# Patient Record
Sex: FEMALE | Race: WHITE | NOT HISPANIC OR LATINO
[De-identification: names, ages, dates, MRNs, and addresses within clinical notes are randomized per-mention and may not be internally consistent; named-entity substitution may affect disease eponyms.]

---

## 2019-09-18 ENCOUNTER — RESULT REVIEW (OUTPATIENT)
Age: 74
End: 2019-09-18

## 2020-09-22 ENCOUNTER — RESULT REVIEW (OUTPATIENT)
Age: 75
End: 2020-09-22

## 2021-03-25 ENCOUNTER — INPATIENT (INPATIENT)
Facility: HOSPITAL | Age: 76
LOS: 4 days | Discharge: HOME | DRG: 443 | End: 2021-03-30
Attending: STUDENT IN AN ORGANIZED HEALTH CARE EDUCATION/TRAINING PROGRAM | Admitting: STUDENT IN AN ORGANIZED HEALTH CARE EDUCATION/TRAINING PROGRAM
Payer: COMMERCIAL

## 2021-03-25 VITALS
SYSTOLIC BLOOD PRESSURE: 133 MMHG | OXYGEN SATURATION: 100 % | WEIGHT: 125 LBS | DIASTOLIC BLOOD PRESSURE: 72 MMHG | TEMPERATURE: 98 F | HEIGHT: 66 IN | HEART RATE: 98 BPM | RESPIRATION RATE: 16 BRPM

## 2021-03-25 DIAGNOSIS — Z98.890 OTHER SPECIFIED POSTPROCEDURAL STATES: Chronic | ICD-10-CM

## 2021-03-25 DIAGNOSIS — Z90.89 ACQUIRED ABSENCE OF OTHER ORGANS: Chronic | ICD-10-CM

## 2021-03-25 DIAGNOSIS — B19.9 UNSPECIFIED VIRAL HEPATITIS WITHOUT HEPATIC COMA: ICD-10-CM

## 2021-03-25 DIAGNOSIS — E78.5 HYPERLIPIDEMIA, UNSPECIFIED: ICD-10-CM

## 2021-03-25 DIAGNOSIS — E03.9 HYPOTHYROIDISM, UNSPECIFIED: ICD-10-CM

## 2021-03-25 DIAGNOSIS — R17 UNSPECIFIED JAUNDICE: ICD-10-CM

## 2021-03-25 DIAGNOSIS — J33.9 NASAL POLYP, UNSPECIFIED: ICD-10-CM

## 2021-03-25 DIAGNOSIS — Z98.49 CATARACT EXTRACTION STATUS, UNSPECIFIED EYE: Chronic | ICD-10-CM

## 2021-03-25 DIAGNOSIS — Z29.9 ENCOUNTER FOR PROPHYLACTIC MEASURES, UNSPECIFIED: ICD-10-CM

## 2021-03-25 LAB
ALBUMIN SERPL ELPH-MCNC: 3.8 G/DL — SIGNIFICANT CHANGE UP (ref 3.3–5)
ALP SERPL-CCNC: 697 U/L — HIGH (ref 40–120)
ALT FLD-CCNC: 2898 U/L — HIGH (ref 12–78)
ANION GAP SERPL CALC-SCNC: 6 MMOL/L — SIGNIFICANT CHANGE UP (ref 5–17)
APAP SERPL-MCNC: <2 UG/ML — LOW (ref 10–30)
APTT BLD: 37.1 SEC — HIGH (ref 27.5–35.5)
AST SERPL-CCNC: 2040 U/L — HIGH (ref 15–37)
BASOPHILS # BLD AUTO: 0.02 K/UL — SIGNIFICANT CHANGE UP (ref 0–0.2)
BASOPHILS NFR BLD AUTO: 0.3 % — SIGNIFICANT CHANGE UP (ref 0–2)
BILIRUB DIRECT SERPL-MCNC: 12.9 MG/DL — HIGH (ref 0.05–0.2)
BILIRUB INDIRECT FLD-MCNC: 3.6 MG/DL — HIGH (ref 0.2–1)
BILIRUB SERPL-MCNC: 16.5 MG/DL — CRITICAL HIGH (ref 0.2–1.2)
BUN SERPL-MCNC: 16 MG/DL — SIGNIFICANT CHANGE UP (ref 7–23)
CALCIUM SERPL-MCNC: 10.2 MG/DL — HIGH (ref 8.5–10.1)
CHLORIDE SERPL-SCNC: 103 MMOL/L — SIGNIFICANT CHANGE UP (ref 96–108)
CK SERPL-CCNC: 95 U/L — SIGNIFICANT CHANGE UP (ref 26–192)
CO2 SERPL-SCNC: 29 MMOL/L — SIGNIFICANT CHANGE UP (ref 22–31)
CREAT SERPL-MCNC: 0.93 MG/DL — SIGNIFICANT CHANGE UP (ref 0.5–1.3)
EOSINOPHIL # BLD AUTO: 0.11 K/UL — SIGNIFICANT CHANGE UP (ref 0–0.5)
EOSINOPHIL NFR BLD AUTO: 1.6 % — SIGNIFICANT CHANGE UP (ref 0–6)
GLUCOSE SERPL-MCNC: 102 MG/DL — HIGH (ref 70–99)
HCT VFR BLD CALC: 39.6 % — SIGNIFICANT CHANGE UP (ref 34.5–45)
HGB BLD-MCNC: 13.6 G/DL — SIGNIFICANT CHANGE UP (ref 11.5–15.5)
IMM GRANULOCYTES NFR BLD AUTO: 0.4 % — SIGNIFICANT CHANGE UP (ref 0–1.5)
INR BLD: 1.08 RATIO — SIGNIFICANT CHANGE UP (ref 0.88–1.16)
IRON SATN MFR SERPL: 224 UG/DL — HIGH (ref 30–160)
IRON SATN MFR SERPL: 53 % — HIGH (ref 14–50)
LIDOCAIN IGE QN: 174 U/L — SIGNIFICANT CHANGE UP (ref 73–393)
LYMPHOCYTES # BLD AUTO: 1.58 K/UL — SIGNIFICANT CHANGE UP (ref 1–3.3)
LYMPHOCYTES # BLD AUTO: 23.6 % — SIGNIFICANT CHANGE UP (ref 13–44)
MCHC RBC-ENTMCNC: 30.4 PG — SIGNIFICANT CHANGE UP (ref 27–34)
MCHC RBC-ENTMCNC: 34.3 GM/DL — SIGNIFICANT CHANGE UP (ref 32–36)
MCV RBC AUTO: 88.6 FL — SIGNIFICANT CHANGE UP (ref 80–100)
MONOCYTES # BLD AUTO: 0.77 K/UL — SIGNIFICANT CHANGE UP (ref 0–0.9)
MONOCYTES NFR BLD AUTO: 11.5 % — SIGNIFICANT CHANGE UP (ref 2–14)
NEUTROPHILS # BLD AUTO: 4.19 K/UL — SIGNIFICANT CHANGE UP (ref 1.8–7.4)
NEUTROPHILS NFR BLD AUTO: 62.6 % — SIGNIFICANT CHANGE UP (ref 43–77)
NRBC # BLD: 0 /100 WBCS — SIGNIFICANT CHANGE UP (ref 0–0)
PLATELET # BLD AUTO: 180 K/UL — SIGNIFICANT CHANGE UP (ref 150–400)
POTASSIUM SERPL-MCNC: 3.9 MMOL/L — SIGNIFICANT CHANGE UP (ref 3.5–5.3)
POTASSIUM SERPL-SCNC: 3.9 MMOL/L — SIGNIFICANT CHANGE UP (ref 3.5–5.3)
PROT SERPL-MCNC: 7.6 G/DL — SIGNIFICANT CHANGE UP (ref 6–8.3)
PROTHROM AB SERPL-ACNC: 12.6 SEC — SIGNIFICANT CHANGE UP (ref 10.6–13.6)
RBC # BLD: 4.47 M/UL — SIGNIFICANT CHANGE UP (ref 3.8–5.2)
RBC # FLD: 18 % — HIGH (ref 10.3–14.5)
SALICYLATES SERPL-MCNC: <1.7 MG/DL — LOW (ref 2.8–20)
SARS-COV-2 RNA SPEC QL NAA+PROBE: SIGNIFICANT CHANGE UP
SODIUM SERPL-SCNC: 138 MMOL/L — SIGNIFICANT CHANGE UP (ref 135–145)
TIBC SERPL-MCNC: 427 UG/DL — SIGNIFICANT CHANGE UP (ref 220–430)
UIBC SERPL-MCNC: 203 UG/DL — SIGNIFICANT CHANGE UP (ref 110–370)
WBC # BLD: 6.7 K/UL — SIGNIFICANT CHANGE UP (ref 3.8–10.5)
WBC # FLD AUTO: 6.7 K/UL — SIGNIFICANT CHANGE UP (ref 3.8–10.5)

## 2021-03-25 PROCEDURE — G1004: CPT

## 2021-03-25 PROCEDURE — 74177 CT ABD & PELVIS W/CONTRAST: CPT | Mod: 26,ME

## 2021-03-25 PROCEDURE — 99223 1ST HOSP IP/OBS HIGH 75: CPT | Mod: GC,AI

## 2021-03-25 PROCEDURE — 99285 EMERGENCY DEPT VISIT HI MDM: CPT

## 2021-03-25 RX ORDER — LEVOTHYROXINE SODIUM 125 MCG
1 TABLET ORAL
Qty: 0 | Refills: 0 | DISCHARGE

## 2021-03-25 RX ORDER — FLUTICASONE PROPIONATE 50 MCG
1 SPRAY, SUSPENSION NASAL
Qty: 0 | Refills: 0 | DISCHARGE

## 2021-03-25 RX ORDER — SODIUM CHLORIDE 9 MG/ML
1000 INJECTION INTRAMUSCULAR; INTRAVENOUS; SUBCUTANEOUS
Refills: 0 | Status: DISCONTINUED | OUTPATIENT
Start: 2021-03-25 | End: 2021-03-27

## 2021-03-25 RX ORDER — MONTELUKAST 4 MG/1
10 TABLET, CHEWABLE ORAL DAILY
Refills: 0 | Status: DISCONTINUED | OUTPATIENT
Start: 2021-03-25 | End: 2021-03-30

## 2021-03-25 RX ORDER — LORATADINE 10 MG/1
10 TABLET ORAL DAILY
Refills: 0 | Status: DISCONTINUED | OUTPATIENT
Start: 2021-03-25 | End: 2021-03-30

## 2021-03-25 RX ORDER — LEVOTHYROXINE SODIUM 125 MCG
25 TABLET ORAL DAILY
Refills: 0 | Status: DISCONTINUED | OUTPATIENT
Start: 2021-03-25 | End: 2021-03-30

## 2021-03-25 RX ORDER — CHLORPHENIRAMINE MALEATE 4 MG
1 TABLET ORAL
Qty: 0 | Refills: 0 | DISCHARGE

## 2021-03-25 RX ORDER — MONTELUKAST 4 MG/1
1 TABLET, CHEWABLE ORAL
Qty: 0 | Refills: 0 | DISCHARGE

## 2021-03-25 RX ORDER — SODIUM CHLORIDE 9 MG/ML
1000 INJECTION INTRAMUSCULAR; INTRAVENOUS; SUBCUTANEOUS ONCE
Refills: 0 | Status: COMPLETED | OUTPATIENT
Start: 2021-03-25 | End: 2021-03-25

## 2021-03-25 RX ORDER — HEPARIN SODIUM 5000 [USP'U]/ML
5000 INJECTION INTRAVENOUS; SUBCUTANEOUS EVERY 12 HOURS
Refills: 0 | Status: DISCONTINUED | OUTPATIENT
Start: 2021-03-25 | End: 2021-03-30

## 2021-03-25 RX ORDER — FLUTICASONE PROPIONATE 50 MCG
1 SPRAY, SUSPENSION NASAL
Refills: 0 | Status: DISCONTINUED | OUTPATIENT
Start: 2021-03-25 | End: 2021-03-30

## 2021-03-25 RX ADMIN — SODIUM CHLORIDE 1000 MILLILITER(S): 9 INJECTION INTRAMUSCULAR; INTRAVENOUS; SUBCUTANEOUS at 13:40

## 2021-03-25 RX ADMIN — SODIUM CHLORIDE 50 MILLILITER(S): 9 INJECTION INTRAMUSCULAR; INTRAVENOUS; SUBCUTANEOUS at 21:39

## 2021-03-25 NOTE — H&P ADULT - ATTENDING COMMENTS
74 YO F PMHX hypothyroidism, HLD, nasal polyps, oA b/l knees, admit for viral hepatitis. Plan: trend lfts, apprec GI/Hepatology collaboration in care, f/u viral hepatitis panel, monitor clinical course

## 2021-03-25 NOTE — H&P ADULT - PROBLEM SELECTOR PLAN 5
vte ppx heparin     IMPROVE VTE Individual Risk Assessment        RISK                                                          Points  [  ] Previous VTE                                                3  [  ] Thrombophilia                                             2  [  ] Lower limb paralysis                                   2        (unable to hold up >15 seconds)    [  ] Current Cancer                                            2         (within 6 months)  [  ] Immobilization > 24 hrs                              1  [  ] ICU/CCU stay > 24 hours                            1  [  ] Age > 60                                                    1  IMPROVE VTE Score ____1_____

## 2021-03-25 NOTE — H&P ADULT - NSHPOUTPATIENTPROVIDERS_GEN_ALL_CORE
[facility] The mother chose not to exclusively breastfeed upon admission.

## 2021-03-25 NOTE — CONSULT NOTE ADULT - SUBJECTIVE AND OBJECTIVE BOX
Chief Complaint:  Patient is a 75y old  Female who presents with a chief complaint of  jaundice    Allergy    Hyperlipidemia    Hypothyroid    No significant past surgical history       HPI: 74yo female p/w yellow sclera" since this past Monday.  Pt had blood work done 3/24/21 with T bili 15.   Pt has been taking  Rosuvastatin.   Pt has been on Tumeric supplement since 12/2020 and no other change in meds.  Ho recent illness, travel outside of [place]. gi consulted for jaundice. chart reviewed. pt seen and examined. no real significant pmhx. states on monday noticed her urine to be brighter and w some yellowing of her eyes. later that night noted entire body to be yellow. [facility] and had labs and ua done, also w rx for abd sono (but not done). was called by pcp regarding very high tbili and instructed to come to ed. denies recent travel, known prior hep exposure, etoh use. denies f/c/wt loss, n/v/d/c/abd pain, melena, brbpr, acholic stools. hx of colon <10yrs ago and reportedly normal. has never had egd. no prior abd sx. fhx- mother w gb issue in her 70s, cannot elaborate further, was removed. meds- low dose statin and tumeric since this past december; home med list reviewed. denies toxic habits.    recent vs/labs/imaging reviewed- afebrile mildly tachy normotensive  inr/plts wnl  elevated tbii/lfts noted, primarily direct  imaging pending  lfts normal 10/2020 as per HIE    aspirin (Rash)      sodium chloride 0.9% Bolus 1000 milliLiter(s) IV Bolus once        FAMILY HISTORY:        Review of Systems:    General:  No wt loss, fevers, chills, night sweats, fatigue  Eyes:  Good vision, no reported pain  ENT:  No sore throat, pain, runny nose, dysphagia  CV:  No pain, palpitations, no lightheadedness  Resp:  No dyspnea, cough, tachypnea, wheezing  GI: see above  :  see above  Muscle:  No pain, weakness  Neuro:  No weakness, tingling, memory problems  Psych:  No fatigue, insomnia, mood problems, depression  Endocrine:  No polyuria, polydypsia, cold/heat intolerance  Heme:  No petechiae, ecchymosis, easy bruisability  Skin:  see above    Relevant Family History:   n/c    Relevant Social History: n/c      Physical Exam:    Vital Signs:  Vital Signs Last 24 Hrs  T(C): 36.7 (25 Mar 2021 12:14), Max: 36.7 (25 Mar 2021 12:14)  T(F): 98 (25 Mar 2021 12:14), Max: 98 (25 Mar 2021 12:14)  HR: 98 (25 Mar 2021 12:14) (98 - 98)  BP: 133/72 (25 Mar 2021 12:14) (133/72 - 133/72)  BP(mean): --  RR: 16 (25 Mar 2021 12:14) (16 - 16)  SpO2: 100% (25 Mar 2021 12:14) (100% - 100%)  Daily Height in cm: 167.64 (25 Mar 2021 12:14)    Daily     General:  lying in bed in nad  HEENT:  NC/AT,  scleral icterus  Abdomen:  Soft, non-tender, non-distended  Extremities:  no edema  Skin:  diffuse jaundice  Neuro/Psych:  A&Ox3    Laboratory:                            13.6   6.70  )-----------( 180      ( 25 Mar 2021 13:43 )             39.6     03-25    138  |  103  |  16  ----------------------------<  102<H>  3.9   |  29  |  0.93    Ca    10.2<H>      25 Mar 2021 13:43    TPro  7.6  /  Alb  3.8  /  TBili  16.5<HH>  /  DBili  12.90<H>  /  AST  2040<H>  /  ALT  2898<H>  /  AlkPhos  697<H>  03-25    LIVER FUNCTIONS - ( 25 Mar 2021 13:43 )  Alb: 3.8 g/dL / Pro: 7.6 g/dL / ALK PHOS: 697 U/L / ALT: 2898 U/L / AST: 2040 U/L / GGT: x           PT/INR - ( 25 Mar 2021 13:42 )   PT: 12.6 sec;   INR: 1.08 ratio         PTT - ( 25 Mar 2021 13:42 )  PTT:37.1 sec    Amylase Serum--      Lipase [id number]       Ammonia--    Imaging:  pending

## 2021-03-25 NOTE — H&P ADULT - PROBLEM SELECTOR PLAN 1
-viral hepatitis, painless jaundice, yellow urine, scleral icterics since Monday, no lesions noted in the pancreas/ampullary lesion    -total bilirubin 16.5, alk P 697, AST 2040, ALT 2898, direct bilirubin 12.9, indirect BR 3.6.   -CT abd and pelvis gallbladder wall edema and periportal edema. Right hepatic cyst with multiple indeterminate hypodense hepatic lesions.  -trend liver enzymes, bilirubin   -hold home rosuvastatin, hold hepatotoxins   -NPO in case of surgery   -Pt received 1L NS bolus  -consulted GI Dr. Rowley -viral hepatitis, painless jaundice, yellow urine, scleral icterics since Monday, no lesions noted in the pancreas/ampullary lesion    -total bilirubin 16.5, alk P 697, AST 2040, ALT 2898, direct bilirubin 12.9, indirect BR 3.6.   -CT abd and pelvis gallbladder wall edema and periportal edema. Right hepatic cyst with multiple indeterminate hypodense hepatic lesions.  -trend hepatic panel, liver enzymes, bilirubin   -IV rate 50 x 24 hrs  -hold home rosuvastatin, hold hepatotoxins   -NPO after midnight in case of procedure    -Pt received 1L NS bolus  -consulted GI Dr. Rowley -viral hepatitis, painless jaundice, yellow urine, scleral icterics since Monday, +Courvoisier's sign   -total bilirubin 16.5, alk P 697, AST 2040, ALT 2898, direct bilirubin 12.9, indirect BR 3.6.   -CT abd and pelvis gallbladder wall edema and periportal edema. Right hepatic cyst with multiple indeterminate hypodense hepatic lesions.  -Pt received 1L NS bolus  -has been taking tumeric, however is not known to be hepatotoxic   -trend hepatic panel, liver enzymes, bilirubin   -IV rate 50 x 24 hrs  -hold home rosuvastatin, hold hepatotoxins   -NPO after midnight in case of procedure    -consider ordering Ca-19 to r/o pancreatic cancer   -consulted GI Dr. Rowley

## 2021-03-25 NOTE — H&P ADULT - PROBLEM SELECTOR PLAN 4
hx of nasal polyps, allergies  continue montelukast 10 mg qD, fluticasone 1 spray qD hx of nasal polyps, allergies  takes montelukast 10 mg qD at home, start Claritin qD as interchange   continue home fluticasone 1 spray each nostril qD

## 2021-03-25 NOTE — H&P ADULT - NSHPREVIEWOFSYSTEMS_GEN_ALL_CORE
CONSTITUTIONAL: denies fever, chills, fatigue, weakness  HEENT: admits to scleral icterus, denies blurred visions, sore throat  SKIN: admits jaundice, denies new lesions, rash  CARDIOVASCULAR: denies chest pain, chest pressure, palpitations  RESPIRATORY: denies shortness of breath, sputum production  GASTROINTESTINAL: denies nausea, vomiting, diarrhea, abdominal pain  GENITOURINARY: admits to bright yellow urine, denies dysuria, discharge  NEUROLOGICAL: denies numbness, headache, focal weakness  MUSCULOSKELETAL: denies new joint pain, muscle aches  HEMATOLOGIC: denies gross bleeding, bruising  LYMPHATICS: denies enlarged lymph nodes, extremity swelling  PSYCHIATRIC: denies recent changes in anxiety, depression  ENDOCRINOLOGIC: denies sweating, cold or heat intolerance

## 2021-03-25 NOTE — H&P ADULT - NSICDXPASTSURGICALHX_GEN_ALL_CORE_FT
PAST SURGICAL HISTORY:  H/O removal of cyst left 3rd digit    S/P cataract surgery     S/P tonsillectomy

## 2021-03-25 NOTE — ED PROVIDER NOTE - OBJECTIVE STATEMENT
"Yellow sclera" since this past Monday.  Pt had blood work done 3/24/21 with T bili 15.   Pt has been taking  Rosuvastatin.   Pt has been on Tumeric supplement since 12/2020 and no other change in meds.  Ho recent illness, travel outside of [place]

## 2021-03-25 NOTE — ED ADULT TRIAGE NOTE - CHIEF COMPLAINT QUOTE
"For some reason out of the blue I turned jaundiced on Monday."  pt had bloodwork and was told by her doctor to go to ED based on blood test results  results show bilirubin of 15.0 mg/dl

## 2021-03-25 NOTE — H&P ADULT - NSHPSOCIALHISTORY_GEN_ALL_CORE
smoked for 2 years in college socially 2-3 cigg. 1x a month at parties, used to drink 1-2 wine glasses a week but since the pandemic has had 1-2 jolie every 4 months, denies other drug use   lives at home alone,   recently, performs all ADLs  ambulates without assistance   Pf[employer] COVID vaccine  and  finished 2/2 doses, had flu vaccine last year    FULL CODE

## 2021-03-25 NOTE — H&P ADULT - HISTORY OF PRESENT ILLNESS
74 YO F PMHX hypothyroidism, HLD, nasal polyps, oA b/l knees, here for jaundice since Monday sent in by PCP for total bilirubin 15. Pt  76 YO F PMHX hypothyroidism, HLD, nasal polyps, oA b/l knees, here for jaundice since Monday sent in by PCP for total bilirubin ~15. On Monday pt noticed bright yellow urine, sclera icterus. Pt then got worried and started drinking more water. Pt then took a shower and noticed her chest and stomach was yellow. Pt did not see a PCP until Wednesday because she was not in pain. On Wednesday pt saw Dr. Hammond, at her PCP's office, had bloodwork and UA done. Pt was supposed to to get an RUQ/abdominal US today, however she received a call form Dr. Hammond to immediately go to the ER for a total bilirubin level of ~15.  Of note, pt has been on rosuvastatin for about 6 years, has been taking tumeric 450 mg since December (for oA). Pt denies fatigue, travel, recent illness. Of note, pt's mother had "some type of liver problem". Pt denies fever, chills, CP, SOB, abdominal pain, edema.     Ed vitals 133/72, HR 98, RR 16, afebrile, 100% on RA.   Labs significant for total bilirubin 16.5, alk P 697, AST 2040, ALT 2898, Br 12.9, indirect bilirubin 3.6.   Pt received 1L NS bolus.   CT abd and pelvis gallbladder wall edema and periportal edema. Findings nonspecific, correlate clinically for acute viral hepatitis. Right hepatic cyst with multiple indeterminate hypodense hepatic lesions.

## 2021-03-25 NOTE — ED ADULT NURSE NOTE - OBJECTIVE STATEMENT
Pt presented to ED complaining of jaundice. Sent by PCP. Noted that Monday had bright yellow urine, Tuesday developed sclera juandice, Wednesday generalized jaundice. Per labs drawn yesterday, elevated LFTs. Notes pt taking 1000mg tumeric daily.

## 2021-03-25 NOTE — H&P ADULT - NSHPPHYSICALEXAM_GEN_ALL_CORE
Vital Signs Last 24 Hrs  T(C): 36.6 (25 Mar 2021 19:11), Max: 36.7 (25 Mar 2021 12:14)  T(F): 97.9 (25 Mar 2021 19:11), Max: 98 (25 Mar 2021 12:14)  HR: 82 (25 Mar 2021 19:11) (82 - 98)  BP: 117/74 (25 Mar 2021 19:11) (117/74 - 133/72)  BP(mean): --  RR: 17 (25 Mar 2021 19:11) (16 - 17)  SpO2: 99% (25 Mar 2021 19:11) (99% - 100%)    Physical Exam:  General: jaundice all over body, well developed, well nourished, no acute distress  HEENT: scleral icterus, normocephallic Atraumatic, PERRLA, EOMI bl, moist mucous membranes   Neck: Supple, nontender, no cervical lymphadenopathy  Neurology: no asterixis, A&Ox3, no focal neurological deficits: CNII-XII intact  Respiratory: Clear To Auscultation B/L, No Wheezes, rhonchi or rales  CV: Regular Rate and Rhythm, +S1/S2, no murmurs, rubs or gallops  Abdominal: Soft, Non-Tender, no TTP, Non-Distended +Bowel Soundsx4  Extremities: LLE oncocytosis, No Clubbing, cyanosis or edema, + peripheral pulses: cap refill <2 secs LE bilaterally  Skin: jaundice all over body, warm, dry, normal color

## 2021-03-25 NOTE — CONSULT NOTE ADULT - PROBLEM SELECTOR RECOMMENDATION 9
concern for head of pancreas/ampullary lesion  await imaging, CT pending  will check liver serologies  hold home statin  trend labs, avoid hepatotoxins  further gi recs pending above; plan dw attg and agreeable

## 2021-03-26 ENCOUNTER — TRANSCRIPTION ENCOUNTER (OUTPATIENT)
Age: 76
End: 2021-03-26

## 2021-03-26 DIAGNOSIS — R79.89 OTHER SPECIFIED ABNORMAL FINDINGS OF BLOOD CHEMISTRY: ICD-10-CM

## 2021-03-26 DIAGNOSIS — R79.0 ABNORMAL LEVEL OF BLOOD MINERAL: ICD-10-CM

## 2021-03-26 LAB
A1AT SERPL-MCNC: 201 MG/DL — HIGH (ref 90–200)
AFP-TM SERPL-MCNC: 7 NG/ML — SIGNIFICANT CHANGE UP
ALBUMIN SERPL ELPH-MCNC: 3.2 G/DL — LOW (ref 3.3–5)
ALP SERPL-CCNC: 562 U/L — HIGH (ref 40–120)
ALT FLD-CCNC: 2302 U/L — HIGH (ref 12–78)
ANA TITR SER: NEGATIVE — SIGNIFICANT CHANGE UP
ANION GAP SERPL CALC-SCNC: 5 MMOL/L — SIGNIFICANT CHANGE UP (ref 5–17)
APPEARANCE UR: CLEAR — SIGNIFICANT CHANGE UP
AST SERPL-CCNC: 1906 U/L — HIGH (ref 15–37)
BACTERIA # UR AUTO: ABNORMAL
BASOPHILS # BLD AUTO: 0.03 K/UL — SIGNIFICANT CHANGE UP (ref 0–0.2)
BASOPHILS NFR BLD AUTO: 0.5 % — SIGNIFICANT CHANGE UP (ref 0–2)
BILIRUB SERPL-MCNC: 14.4 MG/DL — HIGH (ref 0.2–1.2)
BILIRUB UR-MCNC: ABNORMAL
BUN SERPL-MCNC: 12 MG/DL — SIGNIFICANT CHANGE UP (ref 7–23)
CALCIUM SERPL-MCNC: 9 MG/DL — SIGNIFICANT CHANGE UP (ref 8.5–10.1)
CANCER AG19-9 SERPL-ACNC: 15 U/ML — SIGNIFICANT CHANGE UP
CEA SERPL-MCNC: 1.9 NG/ML — SIGNIFICANT CHANGE UP (ref 0–3.8)
CERULOPLASMIN SERPL-MCNC: 35 MG/DL — SIGNIFICANT CHANGE UP (ref 16–45)
CHLORIDE SERPL-SCNC: 108 MMOL/L — SIGNIFICANT CHANGE UP (ref 96–108)
CMV IGG FLD QL: <0.2 U/ML — SIGNIFICANT CHANGE UP
CMV IGG SERPL-IMP: NEGATIVE — SIGNIFICANT CHANGE UP
CMV IGM FLD-ACNC: <8 AU/ML — SIGNIFICANT CHANGE UP
CMV IGM SERPL QL: NEGATIVE — SIGNIFICANT CHANGE UP
CO2 SERPL-SCNC: 28 MMOL/L — SIGNIFICANT CHANGE UP (ref 22–31)
COLOR SPEC: YELLOW — SIGNIFICANT CHANGE UP
COVID-19 SPIKE DOMAIN AB INTERP: POSITIVE
COVID-19 SPIKE DOMAIN ANTIBODY RESULT: >250 U/ML — HIGH
CREAT SERPL-MCNC: 0.74 MG/DL — SIGNIFICANT CHANGE UP (ref 0.5–1.3)
DIFF PNL FLD: NEGATIVE — SIGNIFICANT CHANGE UP
EBV EA AB SER IA-ACNC: >150 U/ML — HIGH
EBV EA AB TITR SER IF: POSITIVE
EBV EA IGG SER-ACNC: POSITIVE
EBV NA IGG SER IA-ACNC: 560 U/ML — HIGH
EBV PATRN SPEC IB-IMP: SIGNIFICANT CHANGE UP
EBV VCA IGG AVIDITY SER QL IA: POSITIVE
EBV VCA IGM SER IA-ACNC: 253 U/ML — HIGH
EBV VCA IGM SER IA-ACNC: <10 U/ML — SIGNIFICANT CHANGE UP
EBV VCA IGM TITR FLD: NEGATIVE — SIGNIFICANT CHANGE UP
EOSINOPHIL # BLD AUTO: 0.27 K/UL — SIGNIFICANT CHANGE UP (ref 0–0.5)
EOSINOPHIL NFR BLD AUTO: 4.4 % — SIGNIFICANT CHANGE UP (ref 0–6)
EPI CELLS # UR: SIGNIFICANT CHANGE UP
FERRITIN SERPL-MCNC: 3118 NG/ML — HIGH (ref 15–150)
GLUCOSE SERPL-MCNC: 74 MG/DL — SIGNIFICANT CHANGE UP (ref 70–99)
GLUCOSE UR QL: NEGATIVE — SIGNIFICANT CHANGE UP
HAV IGM SER-ACNC: SIGNIFICANT CHANGE UP
HBV CORE IGM SER-ACNC: SIGNIFICANT CHANGE UP
HBV DNA # SERPL NAA+PROBE: SIGNIFICANT CHANGE UP IU/ML
HBV DNA SERPL NAA+PROBE-LOG#: SIGNIFICANT CHANGE UP LOG10IU/ML
HBV SURFACE AG SER-ACNC: SIGNIFICANT CHANGE UP
HCT VFR BLD CALC: 35.6 % — SIGNIFICANT CHANGE UP (ref 34.5–45)
HCV AB S/CO SERPL IA: 0.04 S/CO — SIGNIFICANT CHANGE UP (ref 0–0.99)
HCV AB S/CO SERPL IA: 0.05 S/CO — SIGNIFICANT CHANGE UP (ref 0–0.99)
HCV AB SERPL-IMP: SIGNIFICANT CHANGE UP
HCV AB SERPL-IMP: SIGNIFICANT CHANGE UP
HCV RNA SERPL NAA DL=5-ACNC: SIGNIFICANT CHANGE UP IU/ML
HCV RNA SPEC NAA+PROBE-LOG IU: SIGNIFICANT CHANGE UP LOG10IU/ML
HETEROPH AB TITR SER AGGL: NEGATIVE — SIGNIFICANT CHANGE UP
HGB BLD-MCNC: 12.2 G/DL — SIGNIFICANT CHANGE UP (ref 11.5–15.5)
HIV 1 & 2 AB SERPL IA.RAPID: SIGNIFICANT CHANGE UP
HSV1 IGG SER-ACNC: 0.16 INDEX — SIGNIFICANT CHANGE UP
HSV1 IGG SERPL QL IA: NEGATIVE — SIGNIFICANT CHANGE UP
HSV2 IGG FLD-ACNC: 0.06 INDEX — SIGNIFICANT CHANGE UP
HSV2 IGG SERPL QL IA: NEGATIVE — SIGNIFICANT CHANGE UP
IMM GRANULOCYTES NFR BLD AUTO: 0.3 % — SIGNIFICANT CHANGE UP (ref 0–1.5)
KETONES UR-MCNC: ABNORMAL
LEUKOCYTE ESTERASE UR-ACNC: ABNORMAL
LYMPHOCYTES # BLD AUTO: 1.95 K/UL — SIGNIFICANT CHANGE UP (ref 1–3.3)
LYMPHOCYTES # BLD AUTO: 31.8 % — SIGNIFICANT CHANGE UP (ref 13–44)
MCHC RBC-ENTMCNC: 30.6 PG — SIGNIFICANT CHANGE UP (ref 27–34)
MCHC RBC-ENTMCNC: 34.3 GM/DL — SIGNIFICANT CHANGE UP (ref 32–36)
MCV RBC AUTO: 89.2 FL — SIGNIFICANT CHANGE UP (ref 80–100)
MITOCHONDRIA AB SER-ACNC: SIGNIFICANT CHANGE UP
MONOCYTES # BLD AUTO: 0.63 K/UL — SIGNIFICANT CHANGE UP (ref 0–0.9)
MONOCYTES NFR BLD AUTO: 10.3 % — SIGNIFICANT CHANGE UP (ref 2–14)
NEUTROPHILS # BLD AUTO: 3.23 K/UL — SIGNIFICANT CHANGE UP (ref 1.8–7.4)
NEUTROPHILS NFR BLD AUTO: 52.7 % — SIGNIFICANT CHANGE UP (ref 43–77)
NITRITE UR-MCNC: NEGATIVE — SIGNIFICANT CHANGE UP
NRBC # BLD: 0 /100 WBCS — SIGNIFICANT CHANGE UP (ref 0–0)
PH UR: 7 — SIGNIFICANT CHANGE UP (ref 5–8)
PLATELET # BLD AUTO: 159 K/UL — SIGNIFICANT CHANGE UP (ref 150–400)
POTASSIUM SERPL-MCNC: 3.8 MMOL/L — SIGNIFICANT CHANGE UP (ref 3.5–5.3)
POTASSIUM SERPL-SCNC: 3.8 MMOL/L — SIGNIFICANT CHANGE UP (ref 3.5–5.3)
PROT SERPL-MCNC: 6.3 G/DL — SIGNIFICANT CHANGE UP (ref 6–8.3)
PROT UR-MCNC: NEGATIVE — SIGNIFICANT CHANGE UP
RBC # BLD: 3.99 M/UL — SIGNIFICANT CHANGE UP (ref 3.8–5.2)
RBC # FLD: 17.9 % — HIGH (ref 10.3–14.5)
RBC CASTS # UR COMP ASSIST: SIGNIFICANT CHANGE UP /HPF (ref 0–4)
SARS-COV-2 IGG+IGM SERPL QL IA: >250 U/ML — HIGH
SARS-COV-2 IGG+IGM SERPL QL IA: POSITIVE
SMOOTH MUSCLE AB SER-ACNC: SIGNIFICANT CHANGE UP
SODIUM SERPL-SCNC: 141 MMOL/L — SIGNIFICANT CHANGE UP (ref 135–145)
SP GR SPEC: 1 — LOW (ref 1.01–1.02)
TSH SERPL-MCNC: 4.55 UIU/ML — HIGH (ref 0.36–3.74)
UROBILINOGEN FLD QL: 1
VZV IGG FLD QL IA: 1508 INDEX — SIGNIFICANT CHANGE UP
VZV IGG FLD QL IA: POSITIVE — SIGNIFICANT CHANGE UP
WBC # BLD: 6.13 K/UL — SIGNIFICANT CHANGE UP (ref 3.8–10.5)
WBC # FLD AUTO: 6.13 K/UL — SIGNIFICANT CHANGE UP (ref 3.8–10.5)
WBC UR QL: ABNORMAL

## 2021-03-26 PROCEDURE — 99223 1ST HOSP IP/OBS HIGH 75: CPT

## 2021-03-26 PROCEDURE — 99233 SBSQ HOSP IP/OBS HIGH 50: CPT | Mod: GC

## 2021-03-26 PROCEDURE — 74183 MRI ABD W/O CNTR FLWD CNTR: CPT | Mod: 26

## 2021-03-26 RX ADMIN — Medication 1 SPRAY(S): at 18:06

## 2021-03-26 RX ADMIN — HEPARIN SODIUM 5000 UNIT(S): 5000 INJECTION INTRAVENOUS; SUBCUTANEOUS at 18:06

## 2021-03-26 RX ADMIN — HEPARIN SODIUM 5000 UNIT(S): 5000 INJECTION INTRAVENOUS; SUBCUTANEOUS at 05:25

## 2021-03-26 RX ADMIN — Medication 25 MICROGRAM(S): at 05:26

## 2021-03-26 NOTE — PROGRESS NOTE ADULT - PROBLEM SELECTOR PLAN 3
-hold home rosuvastatin in setting of acute hepatitis -continue home levothyroxine 25 mg qD   -TSH elevated 4.5, will repeat in AM with full TFT panel

## 2021-03-26 NOTE — PROGRESS NOTE ADULT - PROBLEM SELECTOR PLAN 2
-continue home levothyroxine 25 mg qD   -TSH elevated 4.5, will repeat in AM with full TFT panel -elevated ferritin 3118, likely elevated as an acute phase reactant however possible hemochromatosis  -iron elevated 224, %sat elevated 53  -will follow up with GI regarding this matter

## 2021-03-26 NOTE — PROGRESS NOTE ADULT - ATTENDING COMMENTS
74 YO F PMHX hypothyroidism, HLD, nasal polyps, oA b/l knees, admitted with for painless jaundice/ conjugated hyperbilirubinemia, likely due to suspected viral hepatitis (although no risk factors) but ddx broad including drug induced hepatitis (turmeric).   - cont levo 25mcg for now, repeat tsh  - pending autoimmune labs, hiv   - cont ivf, monitor liver enzymes, avoid hepatotoxins

## 2021-03-26 NOTE — PROGRESS NOTE ADULT - PROBLEM SELECTOR PLAN 4
hx of nasal polyps, allergies  takes montelukast 10 mg qD at home, start Claritin qD as interchange   continue home fluticasone 1 spray each nostril qD -hold home rosuvastatin in setting of acute hepatitis

## 2021-03-26 NOTE — CONSULT NOTE ADULT - SUBJECTIVE AND OBJECTIVE BOX
[facility]  INFECTIOUS DISEASES   [address] [place] [address]  Tel: [phone]     Fax: [phone]  =======================================================    MRN-[id number]  MARELY GURROLA     CC: Yellow eyes and skin     HPI:  74 y/o woman with PMH of hypothyroidism, HLD on crestor 5mg, nasal polyps, OA was admitted yesterday with jaundice since 3/22. She noted yellow urine and later yellow eyes and skin. Was seen in PCP office and had total bilirubin of 15.   She has no fever, chills, abdominal pain, nausea, vomiting or diarrhea.   The only new changes in her habits is starting Turmeric pills in 2020. She used to drink wine 1-2 glass weekly, never had IVD or heavy drinking. No contact with sick person.   Noted mouse in garage but no other animal contact.   She had a negative acute hepatitis panel.  EBV with evidence of old infection   CT and MRI with pericholecystic edema possibly viral hepatitis.     PAST MEDICAL & SURGICAL HISTORY:  Nasal polyps  Allergy  Hyperlipidemia  Hypothyroid  H/O removal of cyst  left 3rd digit  S/P cataract surgery  S/P tonsillectomy    Social Hx: no smoking, weekly 1-2 glass wine, no drugs     FAMILY HISTORY: mother had some type of issue with gall bladder and had surgery ?     Allergies  aspirin (Rash)    Antibiotics:  None      REVIEW OF SYSTEMS:  CONSTITUTIONAL:  No Fever or chills  HEENT:  No diplopia or blurred vision.  No sore throat or runny nose. Icteric sclera  CARDIOVASCULAR:  No chest pain or SOB.  RESPIRATORY:  No cough, shortness of breath, PND or orthopnea.  GASTROINTESTINAL:  No nausea, vomiting or diarrhea.  GENITOURINARY:  No dysuria, frequency or urgency. No Blood in urine  MUSCULOSKELETAL:  no joint aches, no muscle pain  SKIN:  No change in skin, hair or nails. + jaundice   NEUROLOGIC:  No paresthesias, fasciculations, seizures or weakness.  PSYCHIATRIC:  No disorder of thought or mood.  ENDOCRINE:  No heat or cold intolerance, polyuria or polydipsia.  HEMATOLOGICAL:  No easy bruising or bleeding.     Physical Exam:  Vital Signs Last 24 Hrs  T(C): 36.6 (26 Mar 2021 13:06), Max: 36.7 (26 Mar 2021 00:44)  T(F): 97.9 (26 Mar 2021 13:06), Max: 98 (26 Mar 2021 00:44)  HR: 76 (26 Mar 2021 13:06) (64 - 83)  BP: 117/62 (26 Mar 2021 13:06) (117/62 - 135/84)  RR: 16 (26 Mar 2021 13:06) (16 - 17)  SpO2: 97% (26 Mar 2021 13:06) (97% - 99%)  GEN: NAD  HEENT: normocephalic and atraumatic. EOMI. PERRL. Icteric sclera     NECK: Supple.  No lymphadenopathy   LUNGS: Clear to auscultation.  HEART: Regular rate and rhythm without murmur.  ABDOMEN: Soft, nontender, and nondistended.  Positive bowel sounds.    : No CVA tenderness  EXTREMITIES: Without any cyanosis, clubbing, rash, lesions or edema.  NEUROLOGIC: grossly intact.  PSYCHIATRIC: Appropriate affect .  SKIN: deep yellow color of skin     Labs:      141  |  108  |  12  ----------------------------<  74  3.8   |  28  |  0.74    Ca    9.0      26 Mar 2021 06:30    TPro  6.3  /  Alb  3.2<L>  /  TBili  14.4<H>  /  DBili  11.40<H>  /  AST  1906<H>  /  ALT  2302<H>  /  AlkPhos  562<H>                      12.2   6.13  )-----------( 159      ( 26 Mar 2021 06:30 )             35.6     PT/INR - ( 25 Mar 2021 13:42 )   PT: 12.6 sec;   INR: 1.08 ratio    PTT - ( 25 Mar 2021 13:42 )  PTT:37.1 sec  Urinalysis Basic - ( 26 Mar 2021 12:07 )    Color: Yellow / Appearance: Clear / S.005 / pH: x  Gluc: x / Ketone: Small  / Bili: Small / Urobili: 1   Blood: x / Protein: Negative / Nitrite: Negative   Leuk Esterase: Small / RBC: 0-2 /HPF / WBC 6-10   Sq Epi: x / Non Sq Epi: Occasional / Bacteria: Few    LIVER FUNCTIONS - ( 26 Mar 2021 06:30 )  Alb: 3.2 g/dL / Pro: 6.3 g/dL / ALK PHOS: 562 U/L / ALT: 2302 U/L / AST: 1906 U/L / GGT: x           CARDIAC MARKERS ( 25 Mar 2021 16:40 )  x     / x     / 95 U/L / x     / x        COVID-19 PCR: NotDetec (21 @ 18:56)    All imaging and other data have been reviewed.  < from: MR Abdomen w/wo IV Cont (21 @ 14:28) >  IMPRESSION:  Nonspecific pericholecystic and periportal edema likely reactive.  Mildly enlarged liver. Correlate for possible viral hepatitis  No obstructive biliary pathology    Assessment and Plan:   74 y/o woman with PMH of hypothyroidism, HLD on crestor 5mg, nasal polyps, OA was admitted yesterday with jaundice since 3/22. She noted yellow urine and later yellow eyes and skin. Was seen in PCP office and had total bilirubin of 15.   She has no fever, chills, abdominal pain, nausea, vomiting or diarrhea.   The only new changes in her habits is starting Turmeric pills in 2020. She used to drink wine 1-2 glass weekly, never had IVD or heavy drinking. No contact with sick person.   Noted mouse in garage but no other animal contact. Not sexually active for more than 2 years.   She had a negative acute hepatitis panel.  EBV with evidence of old infection   CT and MRI with pericholecystic edema possibly viral hepatitis.   There are reports that Turmeric could cause hepatitis and liver damage, usually in drug induced hepatitis patients don't have any symptoms same as our case. Usually in viral hepatitis patients have fever, abdominal pain and GI symptoms with severe loss of appetite.   In Hepatitis B and C sometimes there is delay to have a positive serology so Hep B and C serology can be repeated.   Since MRI is negative, malignancy is less likely but usually malignancy such as cholangiocarcinoma or pancreatic ca could cause asymptotic jaundice.     Hepatitis drug induced vs viral vs neoplastic  - Will add Leptospira, CMV, HIV  - Will repeat Hep B and C serology in few days even due no risk factor   - Stop Turmeric pill completely  - Follow LFTs daily   - GI follow up, if no improvement may need ERCP, liver biopsy, ...    Thank you for courtesy of this consult.     Will follow.    Jessica Delgado MD  Division of Infectious Diseases   Cell [phone] between 8am and 6pm   After 6pm and weekends please call ID service at [phone].

## 2021-03-26 NOTE — PROGRESS NOTE ADULT - SUBJECTIVE AND OBJECTIVE BOX
INTERVAL HPI/OVERNIGHT EVENTS:  pt seen and examined  offers no gi complaints  labs/imaging noted    MEDICATIONS  (STANDING):  fluticasone propionate 50 MICROgram(s)/spray Nasal Spray 1 Spray(s) Both Nostrils <User Schedule>  heparin   Injectable 5000 Unit(s) SubCutaneous every 12 hours  levothyroxine 25 MICROGram(s) Oral daily  loratadine 10 milliGRAM(s) Oral daily  montelukast 10 milliGRAM(s) Oral daily  sodium chloride 0.9%. 1000 milliLiter(s) (50 mL/Hr) IV Continuous <Continuous>    MEDICATIONS  (PRN):      Allergies    aspirin (Rash)    Intolerances        Review of Systems:    General:  No wt loss, fevers, chills, night sweats, fatigue   Eyes:  Good vision, no reported pain  ENT:  No sore throat, pain, runny nose, dysphagia  CV:  No pain, palpitations, hypo/hypertension  Resp:  No dyspnea, cough, tachypnea, wheezing  GI:  No pain, No nausea, No vomiting, No diarrhea, No constipation, No weight loss, No fever, No pruritis, No rectal bleeding, No melena, No dysphagia  :  No pain, bleeding, incontinence, nocturia  Muscle:  No pain, weakness  Neuro:  No weakness, tingling, memory problems  Psych:  No fatigue, insomnia, mood problems, depression  Endocrine:  No polyuria, polydypsia, cold/heat intolerance  Heme:  No petechiae, ecchymosis, easy bruisability  Skin:  jaundice       Vital Signs Last 24 Hrs  T(C): 36.4 (26 Mar 2021 05:09), Max: 36.7 (25 Mar 2021 12:14)  T(F): 97.5 (26 Mar 2021 05:09), Max: 98 (25 Mar 2021 12:14)  HR: 64 (26 Mar 2021 05:09) (64 - 98)  BP: 118/68 (26 Mar 2021 05:09) (117/74 - 135/84)  BP(mean): --  RR: 17 (26 Mar 2021 05:09) (16 - 17)  SpO2: 99% (26 Mar 2021 05:09) (97% - 100%)    PHYSICAL EXAM:  General:  lying in bed in nad  HEENT:  NC/AT,  scleral icterus  Abdomen:  Soft, non-tender, non-distended  Extremities:  no edema  Skin:  diffuse jaundice  Neuro/Psych:  A&Ox3      LABS:                        12.2   6.13  )-----------( 159      ( 26 Mar 2021 06:30 )             35.6     03-26    141  |  108  |  12  ----------------------------<  74  3.8   |  28  |  0.74    Ca    9.0      26 Mar 2021 06:30    TPro  6.3  /  Alb  3.2<L>  /  TBili  14.4<H>  /  DBili  11.40<H>  /  AST  1906<H>  /  ALT  2302<H>  /  AlkPhos  562<H>  03-26    PT/INR - ( 25 Mar 2021 13:42 )   PT: 12.6 sec;   INR: 1.08 ratio         PTT - ( 25 Mar 2021 13:42 )  PTT:37.1 sec      RADIOLOGY & ADDITIONAL TESTS:  < from: CT Abdomen and Pelvis w/ IV Cont (03.25.21 @ 15:42) >    EXAM:  CT ABDOMEN AND PELVIS IC                            PROCEDURE DATE:  03/25/2021          INTERPRETATION:  CLINICAL INFORMATION: Abdominal pain. Jaundice.    COMPARISON: None.    CONTRAST/COMPLICATIONS:  IV Contrast: Omnipaque 350  90 cc administered   10 cc discarded  Oral Contrast: NONE  Complications: None reported at time of study completion    PROCEDURE:  CT of the Abdomen and Pelvis was performed.  Sagittal and coronal reformats were performed.    FINDINGS:    LOWER CHEST: There is a 2.2 cm low-density lesion anterior right hepatic lobe with CT attenuation values compatible with hepatic cysts.  There are multiple smaller, indeterminate hypodense hepatic lesions.  There is periportal edema.    LIVER: Within normal limits.  BILE DUCTS: Normal caliber.  GALLBLADDER: Gallbladder wall edema.  SPLEEN: Within normal limits.  PANCREAS: Within normal limits.  ADRENALS: Within normal limits.  KIDNEYS/URETERS: Within normal limits.    BLADDER: Distended, correlate with urine output.  REPRODUCTIVE ORGANS: Bulky coarse calcified uterine fibroids.    BOWEL: Colonic distention with fecal retention.  Moderately distended, fluid-filled stomach. There is fluid filled duodenum with bowel wall thickening involving the descending and transverse portion of the duodenum extending to the duodenojejunal junction. Recommend correlation for duodenitis.  Appendix normal.  PERITONEUM: No ascites.    VESSELS: Atherosclerotic calcification, abdominal aorta is normal in caliber.  RETROPERITONEUM/LYMPH NODES: No enlarged lymphadenopathy.  ABDOMINAL WALL: Within normal limits.    BONES: Degenerative changes spine.    IMPRESSION:    Gallbladder wall edema and periportal edema.  Findings nonspecific, correlate clinically for acute viral hepatitis.    Right hepatic cyst with multiple indeterminate hypodense hepatic lesions.  Recommend further after characterization with nonemergent MRI if there are no clinical contraindications.    Other findings as discussed above.              LOLA CRUZ M.D., ATTENDING RADIOLOGIST  This document has been electronically signed. Mar 25 2021  4:15PM    < end of copied text >

## 2021-03-26 NOTE — DISCHARGE NOTE PROVIDER - HOSPITAL COURSE
FROM ADMISSION H+P:   HPI:  76 YO F PMHX hypothyroidism, HLD, nasal polyps, oA b/l knees, here for jaundice since Monday sent in by PCP for total bilirubin ~15. On Monday pt noticed bright yellow urine, sclera icterus. Pt then got worried and started drinking more water. Pt then took a shower and noticed her chest and stomach was yellow. Pt did not see a PCP until Wednesday because she was not in pain. On Wednesday pt saw Dr. Hammond, at her PCP's office, had bloodwork and UA done. Pt was supposed to to get an RUQ/abdominal US today, however she received a call form Dr. Hammond to immediately go to the ER for a total bilirubin level of ~15.  Of note, pt has been on rosuvastatin for about 6 years, has been taking tumeric 450 mg since December (for oA). Pt denies fatigue, travel, recent illness. Of note, pt's mother had "some type of liver problem". Pt denies fever, chills, CP, SOB, abdominal pain, edema.     Ed vitals 133/72, HR 98, RR 16, afebrile, 100% on RA.   Labs significant for total bilirubin 16.5, alk P 697, AST 2040, ALT 2898, Br 12.9, indirect bilirubin 3.6.   Pt received 1L NS bolus.   CT abd and pelvis gallbladder wall edema and periportal edema. Findings nonspecific, correlate clinically for acute viral hepatitis. Right hepatic cyst with multiple indeterminate hypodense hepatic lesions.     (25 Mar 2021 19:04)    ---  HOSPITAL COURSE: Patient was admitted to the hospital due to elevated LFTs and acute hepatitis workup. CT scan A/P done showed gallbladder wall edema and periportal edema. Findings nonspecific, correlate clinically for acute viral hepatitis. Right hepatic cyst with multiple indeterminate hypodense hepatic lesions. Acute viral hepatitis panel was _______. Positive EBV early antigen positive and ID consulted. Autoimmune panel done and showed __________. Ca 19-9 checked and negative. GI was consulted and recommended MRI Abdomen with and without contrast with showed Nonspecific pericholecystic and periportal edema likely reactive. Mildly enlarged liver. Correlate for possible viral hepatitis. No obstructive biliary pathology. Hepatotoxic drugs were held and LFTs downtrended during hospitalization. Patient was seen and examined on day of discharge and found to be hemodynamically stable and medically optimized for dc with close GI follow up.     ---  CONSULTANTS:   GI- Dr. Escobedo  ID- _______      ---  TIME SPENT:  The total amount of time spent reviewing the hospital notes, laboratory values, imaging findings, assessing/counseling the patient, discussing with consultant physicians, social work, nursing staff was -- minutes       FROM ADMISSION H+P:   HPI:  76 YO F PMHX hypothyroidism, HLD, nasal polyps, oA b/l knees, here for jaundice since Monday sent in by PCP for total bilirubin ~15. On Monday pt noticed bright yellow urine, sclera icterus. Pt then got worried and started drinking more water. Pt then took a shower and noticed her chest and stomach was yellow. Pt did not see a PCP until Wednesday because she was not in pain. On Wednesday pt saw Dr. Hammond, at her PCP's office, had bloodwork and UA done. Pt was supposed to to get an RUQ/abdominal US today, however she received a call form Dr. Hammond to immediately go to the ER for a total bilirubin level of ~15.  Of note, pt has been on rosuvastatin for about 6 years, has been taking tumeric 450 mg since December (for oA). Pt denies fatigue, travel, recent illness. Of note, pt's mother had "some type of liver problem". Pt denies fever, chills, CP, SOB, abdominal pain, edema.     Ed vitals 133/72, HR 98, RR 16, afebrile, 100% on RA.   Labs significant for total bilirubin 16.5, alk P 697, AST 2040, ALT 2898, Br 12.9, indirect bilirubin 3.6.   Pt received 1L NS bolus.   CT abd and pelvis gallbladder wall edema and periportal edema. Findings nonspecific, correlate clinically for acute viral hepatitis. Right hepatic cyst with multiple indeterminate hypodense hepatic lesions.     (25 Mar 2021 19:04)    ---  HOSPITAL COURSE: Patient was admitted to the hospital due to elevated LFTs and acute hepatitis workup. CT scan A/P done showed gallbladder wall edema and periportal edema. Findings nonspecific, correlate clinically for acute viral hepatitis. Right hepatic cyst with multiple indeterminate hypodense hepatic lesions. Acute viral hepatitis panel was negative, ?repeat panel ______. Positive EBV early antigen positive and ID consulted, this was consistent with chronic infection. CMV, HIV, herpes and varicella were also negative. Lepto was also performed and found to be _____. Autoimmune panel done and was negative. Ca 19-9 checked and negative. Only known potential hepatotoxin that pt was taking prior to hospitalization was turmeric supplements which is likely the cause of drug induced hepatitis at this time. Neoplastic hepatitis was also evaluated for however in setting of negative masses or obstructions on MRI this is unlikely. GI was consulted and recommended MRI Abdomen with and without contrast with showed Nonspecific pericholecystic and periportal edema likely reactive. Mildly enlarged liver. Correlate for possible viral hepatitis. No obstructive biliary pathology. Hepatotoxic drugs were held and LFTs downtrended during hospitalization. Patient remained comfortable and asymptomatic during hospital course. Patient was seen and examined on day of discharge and found to be hemodynamically stable and medically optimized for dc with close GI follow up.     ---  CONSULTANTS:   GI- Dr. Escobedo  ID- Dr. Delgado    REVIEW OF SYSTEMS:  CONSTITUTIONAL: No fever or chills  HEENT:  No headache, no sore throat  RESPIRATORY: No cough, wheezing, or shortness of breath  CARDIOVASCULAR: No chest pain, palpitations  GASTROINTESTINAL: +jaundice, No abd pain, nausea, vomiting, diarrhea, or weight loss  GENITOURINARY: No dysuria, frequency, or hematuria  NEUROLOGICAL: no focal weakness or dizziness  MUSCULOSKELETAL: no myalgias or joint pains      Vital Signs Last 24 Hrs      PHYSICAL EXAM:  GENERAL: NAD, sitting up in chair comfortably; yellow appearing skin throughout  HEENT:  icteric, moist mucous membranes  CHEST/LUNG:  CTA b/l, no rales, wheezes, or rhonchi  HEART:  RRR, S1, S2  ABDOMEN:  BS+, soft, nontender, nondistended  EXTREMITIES: no edema, cyanosis, or calf tenderness  NERVOUS SYSTEM: answers questions and follows commands appropriately    ---  TIME SPENT:  The total amount of time spent reviewing the hospital notes, laboratory values, imaging findings, assessing/counseling the patient, discussing with consultant physicians, social work, nursing staff was -- minutes       FROM ADMISSION H+P:   HPI:  74 YO F PMHX hypothyroidism, HLD, nasal polyps, oA b/l knees, here for jaundice since Monday sent in by PCP for total bilirubin ~15. On Monday pt noticed bright yellow urine, sclera icterus. Pt then got worried and started drinking more water. Pt then took a shower and noticed her chest and stomach was yellow. Pt did not see a PCP until Wednesday because she was not in pain. On Wednesday pt saw Dr. Hammond, at her PCP's office, had bloodwork and UA done. Pt was supposed to to get an RUQ/abdominal US today, however she received a call form Dr. Hammond to immediately go to the ER for a total bilirubin level of ~15.  Of note, pt has been on rosuvastatin for about 6 years, has been taking tumeric 450 mg since December (for oA). Pt denies fatigue, travel, recent illness. Of note, pt's mother had "some type of liver problem". Pt denies fever, chills, CP, SOB, abdominal pain, edema.     Ed vitals 133/72, HR 98, RR 16, afebrile, 100% on RA.   Labs significant for total bilirubin 16.5, alk P 697, AST 2040, ALT 2898, Br 12.9, indirect bilirubin 3.6.   Pt received 1L NS bolus.   CT abd and pelvis gallbladder wall edema and periportal edema. Findings nonspecific, correlate clinically for acute viral hepatitis. Right hepatic cyst with multiple indeterminate hypodense hepatic lesions.     (25 Mar 2021 19:04)    ---  HOSPITAL COURSE: Patient was admitted to the hospital due to elevated LFTs, jaundice and acute hepatitis workup. CT scan A/P done showed gallbladder wall edema and periportal edema. Findings nonspecific, correlate clinically for acute viral hepatitis. Right hepatic cyst with multiple indeterminate hypodense hepatic lesions. Acute viral hepatitis panel was negative. Positive EBV early antigen positive and ID consulted, this was consistent with chronic infection. CMV, HIV, herpes and varicella were also negative. Leptospirosis was also performed and found to be negative. Autoimmune panel done and was negative. Ca 19-9 checked and negative. Only known potential hepatotoxin that patient was taking prior to hospitalization was turmeric supplements which is likely the cause of drug induced hepatitis at this time. Neoplastic hepatitis was also evaluated for however in setting of negative masses or obstructions on MRI this is unlikely. GI was consulted and recommended MRI Abdomen with and without contrast which showed nonspecific pericholecystic and periportal edema likely reactive. Mildly enlarged liver. Correlate for possible viral hepatitis. No obstructive biliary pathology. GI started the patient on ursodiol. Patient complained of some GI upset but agreed to continue ursodiol as an outpatient as tolerated. Hepatotoxic drugs, specifically statin, were held and LFTs downtrended during hospitalization. Patient's total and direct bilirubin also downtrended. Patient remained comfortable and asymptomatic during hospital course. Patient was seen and examined on day of discharge and found to be hemodynamically stable and medically optimized for dc with close GI follow up.     ---  CONSULTANTS:   GI- Dr. Sheikh GALVAN- Dr. Delgado    Physical Exam on Day of Discharge:  Patient seen and examined on day of discharge. She is feeling well and has no acute complaints at this time.     REVIEW OF SYSTEMS:  CONSTITUTIONAL: No fever or chills  HEENT:  No headache, no sore throat  RESPIRATORY: No cough, wheezing, or shortness of breath  CARDIOVASCULAR: No chest pain, palpitations  GASTROINTESTINAL: +jaundice, No abd pain, nausea, vomiting, diarrhea, or weight loss  GENITOURINARY: No dysuria, frequency, or hematuria  NEUROLOGICAL: no focal weakness or dizziness  MUSCULOSKELETAL: no myalgias or joint pains      Vital Signs Last 24 Hrs  T(C): 36.8 (30 Mar 2021 04:58), Max: 36.8 (30 Mar 2021 04:58)  T(F): 98.3 (30 Mar 2021 04:58), Max: 98.3 (30 Mar 2021 04:58)  HR: 63 (30 Mar 2021 04:58) (63 - 71)  BP: 109/57 (30 Mar 2021 04:58) (107/61 - 109/57)  BP(mean): --  RR: 18 (30 Mar 2021 04:58) (16 - 18)  SpO2: 98% (30 Mar 2021 04:58) (95% - 98%)    PHYSICAL EXAM:  GENERAL: NAD, sitting up in chair comfortably; yellow appearing skin throughout  HEENT:  icteric, moist mucous membranes  CHEST/LUNG:  CTA b/l, no rales, wheezes, or rhonchi  HEART:  RRR, S1, S2  ABDOMEN:  BS+, soft, nontender, nondistended  EXTREMITIES: no edema, cyanosis, or calf tenderness  NERVOUS SYSTEM: answers questions and follows commands appropriately           FROM ADMISSION H+P:   HPI:  76 YO F PMHX hypothyroidism, HLD, nasal polyps, oA b/l knees, here for jaundice since Monday sent in by PCP for total bilirubin ~15. On Monday pt noticed bright yellow urine, sclera icterus. Pt then got worried and started drinking more water. Pt then took a shower and noticed her chest and stomach was yellow. Pt did not see a PCP until Wednesday because she was not in pain. On Wednesday pt saw Dr. Hebert, at her PCP's office, had bloodwork and UA done. Pt was supposed to to get an RUQ/abdominal US today, however she received a call form Dr. Hebert to immediately go to the ER for a total bilirubin level of ~15.  Of note, pt has been on rosuvastatin for about 6 years, has been taking tumeric 450 mg since December (for oA). Pt denies fatigue, travel, recent illness. Of note, pt's mother had "some type of liver problem". Pt denies fever, chills, CP, SOB, abdominal pain, edema.     Ed vitals 133/72, HR 98, RR 16, afebrile, 100% on RA.   Labs significant for total bilirubin 16.5, alk P 697, AST 2040, ALT 2898, Br 12.9, indirect bilirubin 3.6.   Pt received 1L NS bolus.   CT abd and pelvis gallbladder wall edema and periportal edema. Findings nonspecific, correlate clinically for acute viral hepatitis. Right hepatic cyst with multiple indeterminate hypodense hepatic lesions.     (25 Mar 2021 19:04)    ---  HOSPITAL COURSE: Patient was admitted to the hospital due to elevated LFTs, jaundice and acute hepatitis workup. CT scan A/P done showed gallbladder wall edema and periportal edema. Findings nonspecific, correlate clinically for acute viral hepatitis. Right hepatic cyst with multiple indeterminate hypodense hepatic lesions. Acute viral hepatitis panel was negative. Positive EBV early antigen positive and ID consulted, this was consistent with chronic infection. CMV, HIV, herpes and varicella were also negative. Leptospirosis was also performed and found to be negative. Autoimmune panel done and was negative. Ca 19-9 checked and negative. Only known potential hepatotoxin that patient was taking prior to hospitalization was turmeric supplements which is likely the cause of drug induced hepatitis at this time. Neoplastic hepatitis was also evaluated for however in setting of negative masses or obstructions on MRI this is unlikely. GI was consulted and recommended MRI Abdomen with and without contrast which showed nonspecific pericholecystic and periportal edema. Mildly enlarged liver. Correlate for possible viral hepatitis. No obstructive biliary pathology. GI started the patient on ursodiol. Patient complained of some GI upset but agreed to continue ursodiol as an outpatient as tolerated. Hepatotoxic drugs, specifically statin, were held and LFTs downtrended during hospitalization. Patient's total and direct bilirubin also downtrended. Patient remained comfortable and asymptomatic during hospital course. Patient was seen and examined on day of discharge and found to be hemodynamically stable and medically optimized for dc with close GI follow up.     ---  CONSULTANTS:   GI- Dr. Sheikh GALVAN- Dr. Delgado  Hem/onc- Dr. Peterson    Physical Exam on Day of Discharge:  Patient seen and examined on day of discharge. She is feeling well and has no acute complaints at this time.    REVIEW OF SYSTEMS:  CONSTITUTIONAL: No fever or chills  HEENT:  No headache, no sore throat  RESPIRATORY: No cough, wheezing, or shortness of breath  CARDIOVASCULAR: No chest pain, palpitations  GASTROINTESTINAL: +jaundice, No abd pain, nausea, vomiting, diarrhea, or weight loss  GENITOURINARY: No dysuria, frequency, or hematuria  NEUROLOGICAL: no focal weakness or dizziness  MUSCULOSKELETAL: no myalgias or joint pains    Vital Signs Last 24 Hrs  T(C): 36.8 (30 Mar 2021 04:58), Max: 36.8 (30 Mar 2021 04:58)  T(F): 98.3 (30 Mar 2021 04:58), Max: 98.3 (30 Mar 2021 04:58)  HR: 63 (30 Mar 2021 04:58) (63 - 71)  BP: 109/57 (30 Mar 2021 04:58) (107/61 - 109/57)  BP(mean): --  RR: 18 (30 Mar 2021 04:58) (16 - 18)  SpO2: 98% (30 Mar 2021 04:58) (95% - 98%)    PHYSICAL EXAM:  GENERAL: NAD, sitting up in chair comfortably; yellow appearing skin throughout  HEENT:  icteric, moist mucous membranes  CHEST/LUNG:  CTA b/l, no rales, wheezes, or rhonchi  HEART:  RRR, S1, S2  ABDOMEN:  BS+, soft, nontender, nondistended  EXTREMITIES: no edema, cyanosis, or calf tenderness  NERVOUS SYSTEM: answers questions and follows commands appropriately    _____           FROM ADMISSION H+P:   HPI:  76 YO F PMHX hypothyroidism, HLD, nasal polyps, oA b/l knees, here for jaundice since Monday sent in by PCP for total bilirubin ~15. On Monday pt noticed bright yellow urine, sclera icterus. Pt then got worried and started drinking more water. Pt then took a shower and noticed her chest and stomach was yellow. Pt did not see a PCP until Wednesday because she was not in pain. On Wednesday pt saw Dr. Hebert, at her PCP's office, had bloodwork and UA done. Pt was supposed to to get an RUQ/abdominal US today, however she received a call form Dr. Hebert to immediately go to the ER for a total bilirubin level of ~15.  Of note, pt has been on rosuvastatin for about 6 years, has been taking tumeric 450 mg since December (for oA). Pt denies fatigue, travel, recent illness. Of note, pt's mother had "some type of liver problem". Pt denies fever, chills, CP, SOB, abdominal pain, edema.     Ed vitals 133/72, HR 98, RR 16, afebrile, 100% on RA.   Labs significant for total bilirubin 16.5, alk P 697, AST 2040, ALT 2898, Br 12.9, indirect bilirubin 3.6.   Pt received 1L NS bolus.   CT abd and pelvis gallbladder wall edema and periportal edema. Findings nonspecific, correlate clinically for acute viral hepatitis. Right hepatic cyst with multiple indeterminate hypodense hepatic lesions.     (25 Mar 2021 19:04)    ---  HOSPITAL COURSE: Patient was admitted to the hospital due to elevated LFTs, jaundice and acute hepatitis workup. CT scan A/P done showed gallbladder wall edema and periportal edema. Findings nonspecific, correlate clinically for acute viral hepatitis. Right hepatic cyst with multiple indeterminate hypodense hepatic lesions. Acute viral hepatitis panel was negative. Positive EBV early antigen positive and ID consulted, this was consistent with chronic infection. CMV, HIV, herpes and varicella were also negative. Leptospirosis was also performed and found to be negative. Autoimmune panel done and was negative. Ca 19-9 checked and negative. Only known potential hepatotoxin that patient was taking prior to hospitalization was turmeric supplements which is likely the cause of drug induced hepatitis at this time. Neoplastic hepatitis was also evaluated for however in setting of negative masses or obstructions on MRI this is unlikely. GI was consulted and recommended MRI Abdomen with and without contrast which showed nonspecific pericholecystic and periportal edema. Mildly enlarged liver. Correlate for possible viral hepatitis. No obstructive biliary pathology. GI started the patient on ursodiol. Patient complained of some GI upset but agreed to continue ursodiol as an outpatient as tolerated. Hepatotoxic drugs, specifically statin, were held and LFTs downtrended during hospitalization. Patient's total and direct bilirubin also downtrended. Patient remained comfortable and asymptomatic during hospital course. Patient was seen and examined on day of discharge and found to be hemodynamically stable and medically optimized for dc with close GI follow up.     ---  CONSULTANTS:   GI- Dr. Sheikh GALVAN- Dr. Delgado  Hem/onc- Dr. Peterson    Physical Exam on Day of Discharge:  Patient seen and examined on day of discharge. She is feeling well and has no acute complaints at this time.    REVIEW OF SYSTEMS:  CONSTITUTIONAL: No fever or chills  HEENT:  No headache, no sore throat  RESPIRATORY: No cough, wheezing, or shortness of breath  CARDIOVASCULAR: No chest pain, palpitations  GASTROINTESTINAL: +jaundice, No abd pain, nausea, vomiting, diarrhea, or weight loss  GENITOURINARY: No dysuria, frequency, or hematuria  NEUROLOGICAL: no focal weakness or dizziness  MUSCULOSKELETAL: no myalgias or joint pains    Vital Signs Last 24 Hrs  T(C): 36.8 (30 Mar 2021 04:58), Max: 36.8 (30 Mar 2021 04:58)  T(F): 98.3 (30 Mar 2021 04:58), Max: 98.3 (30 Mar 2021 04:58)  HR: 63 (30 Mar 2021 04:58) (63 - 71)  BP: 109/57 (30 Mar 2021 04:58) (107/61 - 109/57)  BP(mean): --  RR: 18 (30 Mar 2021 04:58) (16 - 18)  SpO2: 98% (30 Mar 2021 04:58) (95% - 98%)    PHYSICAL EXAM:  GENERAL: NAD, sitting up in chair comfortably; yellow appearing skin throughout  HEENT:  icteric, moist mucous membranes  CHEST/LUNG:  CTA b/l, no rales, wheezes, or rhonchi  HEART:  RRR, S1, S2  ABDOMEN:  BS+, soft, nontender, nondistended  EXTREMITIES: no edema, cyanosis, or calf tenderness  NERVOUS SYSTEM: answers questions and follows commands appropriately  _____

## 2021-03-26 NOTE — DISCHARGE NOTE PROVIDER - NSDCMRMEDTOKEN_GEN_ALL_CORE_FT
biotin 100 m tab(s) orally once a day (at bedtime)  Calcium 600+D oral tablet: 1 tab(s) orally 2 times a day  chlorpheniramine 4 mg oral tablet: 1 tab(s) orally 2 times a day  Cosamin  mg-400 mg oral tablet: 1 tab(s) orally once a day  Evening Primrose Oil oral capsule: 100 milligram(s) orally once a day (at bedtime)  Flax Seed Oil oral capsule: 1 tab(s) orally once a day  fluticasone 27.5 mcg/inh nasal spray: 1 spray(s) nasal once a day (in the evening)  levothyroxine 25 mcg (0.025 mg) oral tablet: 1 tab(s) orally once a day (in the evening)  montelukast 10 mg oral tablet: 1 tab(s) orally once a day (in the evening)  rosuvastatin 5 mg oral tablet: 1 tab(s) orally once a day  tumeric 450 m tab(s) orally once a day   chlorpheniramine 4 mg oral tablet: 1 tab(s) orally 2 times a day  fluticasone 27.5 mcg/inh nasal spray: 1 spray(s) nasal once a day (in the evening)  levothyroxine 25 mcg (0.025 mg) oral tablet: 1 tab(s) orally once a day (in the evening)  montelukast 10 mg oral tablet: 1 tab(s) orally once a day (in the evening)   fluticasone 27.5 mcg/inh nasal spray: 1 spray(s) nasal once a day (in the evening)  levothyroxine 25 mcg (0.025 mg) oral tablet: 1 tab(s) orally once a day (in the evening)  montelukast 10 mg oral tablet: 1 tab(s) orally once a day (in the evening)  ursodiol 300 mg oral capsule: 1 cap(s) orally every 12 hours

## 2021-03-26 NOTE — PROGRESS NOTE ADULT - PROBLEM SELECTOR PLAN 1
ctap reviewed  liver engorged, no duct blockage, no hepatobiliary mass noted  some downtrend in lfts today; suspecting viral hepatitis   will check mri abd w iv con for further eval  f/u remaining liver serologies  continue to hold home statin  trend labs, avoid hepatotoxins  plan dw attg and agreeable, will follow

## 2021-03-26 NOTE — PROGRESS NOTE ADULT - NUTRITIONAL ASSESSMENT
-painless jaundice, yellow urine, scleral icterics since Monday, +Courvoisier's sign, suspected to have viral hepatitis vs autoimmune hep vs ?malignancy  -CT abd and pelvis gallbladder wall edema and periportal edema. Right hepatic cyst with multiple indeterminate hypodense hepatic lesions.  -LFTs improving however remains elevated; , ALT 2303, AST 1906, D bili 11.4, In bili 3.0, T bili 14.4  -viral hep panel negative so far, EBV panels positive, autoimmune panel pending  -Pt received 1L NS bolus in ED, will continue gentle hydration while NPO  -has been taking tumeric, however is not known to be hepatotoxic   -trend hepatic panel, liver enzymes, bilirubin, PT/INR  -hold home rosuvastatin, hold hepatotoxins   -NPO after midnight, will advance to regular once MRI complete  -MR abdomen with and without IV contrast pending, f/u  -consider ordering Ca-19 to r/o pancreatic cancer   -will order HIV for AM  -GI (Dr. Rowley) following, recs appreciated

## 2021-03-26 NOTE — PROGRESS NOTE ADULT - PROBLEM SELECTOR PLAN 2
as above        Advanced care planning was discussed with patient and family.  Advanced care planning forms were reviewed and discussed.  Risks, benefits and alternatives of gastroenterologic procedures were discussed in detail and all questions were answered.    30 minutes spent.

## 2021-03-26 NOTE — DISCHARGE NOTE PROVIDER - INSTRUCTIONS
regular diet Continue to eat a diet with low cholesterol as you will be off of your statin medication.

## 2021-03-26 NOTE — DISCHARGE NOTE PROVIDER - PROVIDER TOKENS
PROVIDER:[TOKEN:[[id number]]],PROVIDER:[TOKEN:[[id number]]],PROVIDER:[TOKEN:[[id number]]] PROVIDER:[TOKEN:[[id number]],FOLLOWUP:[1 week]],PROVIDER:[TOKEN:[[id number]],FOLLOWUP:[1 week]],PROVIDER:[TOKEN:[[id number]],FOLLOWUP:[1 month]],PROVIDER:[TOKEN:[[id number]]]

## 2021-03-26 NOTE — PROGRESS NOTE ADULT - PROBLEM SELECTOR PLAN 1
-painless jaundice, yellow urine, scleral icterics since Monday, +Courvoisier's sign, suspected to have viral hepatitis vs autoimmune hep vs ?malignancy  -CT abd and pelvis gallbladder wall edema and periportal edema. Right hepatic cyst with multiple indeterminate hypodense hepatic lesions.  -LFTs improving however remains elevated; , ALT 2303, AST 1906, D bili 11.4, In bili 3.0, T bili 14.4  -viral hep panel negative so far, EBV panels positive, autoimmune panel pending  -Pt received 1L NS bolus in ED, will continue gentle hydration while NPO  -has been taking tumeric, however is not known to be hepatotoxic   -trend hepatic panel, liver enzymes, bilirubin   -hold home rosuvastatin, hold hepatotoxins   -NPO after midnight, will advance to regular once MRI complete  -MR abdomen with and without IV contrast pending, f/u  -consider ordering Ca-19 to r/o pancreatic cancer   -GI (Dr. Rowley) following, recs appreciated -painless jaundice, yellow urine, scleral icterics since Monday, +Courvoisier's sign, suspected to have viral hepatitis vs autoimmune hep vs ?malignancy  -CT abd and pelvis gallbladder wall edema and periportal edema. Right hepatic cyst with multiple indeterminate hypodense hepatic lesions.  -LFTs improving however remains elevated; , ALT 2303, AST 1906, D bili 11.4, In bili 3.0, T bili 14.4  -viral hep panel negative so far, EBV panels positive, autoimmune panel pending  -Pt received 1L NS bolus in ED, will continue gentle hydration while NPO  -has been taking tumeric, however is not known to be hepatotoxic   -trend hepatic panel, liver enzymes, bilirubin, PT/INR  -hold home rosuvastatin, hold hepatotoxins   -NPO after midnight, will advance to regular once MRI complete  -MR abdomen with and without IV contrast pending, f/u  -consider ordering Ca-19 to r/o pancreatic cancer   -will order HIV for AM  -GI (Dr. Rowley) following, recs appreciated -painless jaundice, yellow urine, scleral icterics since Monday, +Courvoisier's sign, suspected to have viral hepatitis vs autoimmune hep vs ?malignancy  -CT abd and pelvis gallbladder wall edema and periportal edema. Right hepatic cyst with multiple indeterminate hypodense hepatic lesions.  -LFTs improving however remains elevated; , ALT 2303, AST 1906, D bili 11.4, In bili 3.0, T bili 14.4  -viral hep panel negative so far, EBV panels positive, autoimmune panel pending  -Pt received 1L NS bolus in ED, will continue gentle hydration while NPO  -has been taking tumeric, however is not known to be hepatotoxic   -trend hepatic panel, liver enzymes, bilirubin, PT/INR  -hold home rosuvastatin, hold hepatotoxins   -NPO after midnight, will advance to regular once MRI complete  -MR abdomen with and without IV contrast shows Nonspecific pericholecystic and periportal edema likely reactive. Mildly enlarged liver. Correlate for possible viral hepatitis. No obstructive biliary pathology  -Ca-19 negative  -ceruloplasmin negative, Alpha 1 antitrypsin borderline high, likely insignificant in this scenario  -will order HIV for AM  -GI (Dr. Rowley) following, recs appreciated  -ID consult placed for positive EBV panel

## 2021-03-26 NOTE — DISCHARGE NOTE PROVIDER - CARE PROVIDERS DIRECT ADDRESSES
,DirectAddress_Unknown,[email or web address],DirectAddress_Unknown ,DirectAddress_Unknown,[email or web address],DirectAddress_Unknown,DirectAddress_Unknown

## 2021-03-26 NOTE — PROGRESS NOTE ADULT - SUBJECTIVE AND OBJECTIVE BOX
Patient is a 75y old  Female who presents with a chief complaint of jaundice (26 Mar 2021 09:28)      INTERVAL HPI/OVERNIGHT EVENTS: No acute events overnight. Patient seen and examined at bedside. Appears jaundiced however comfortable without severe pain. Denies fevers, chills, CP, SOB, abd pain, N, V, D, urinary symptoms or focal weakness.     MEDICATIONS  (STANDING):  fluticasone propionate 50 MICROgram(s)/spray Nasal Spray 1 Spray(s) Both Nostrils <User Schedule>  heparin   Injectable 5000 Unit(s) SubCutaneous every 12 hours  levothyroxine 25 MICROGram(s) Oral daily  loratadine 10 milliGRAM(s) Oral daily  montelukast 10 milliGRAM(s) Oral daily  sodium chloride 0.9%. 1000 milliLiter(s) (50 mL/Hr) IV Continuous <Continuous>    MEDICATIONS  (PRN):      Allergies    aspirin (Rash)    Intolerances        REVIEW OF SYSTEMS:  CONSTITUTIONAL: No fever or chills  HEENT:  No headache, no sore throat  RESPIRATORY: No cough, wheezing, or shortness of breath  CARDIOVASCULAR: No chest pain, palpitations  GASTROINTESTINAL: +jaundice, No abd pain, nausea, vomiting, diarrhea, or weight loss  GENITOURINARY: No dysuria, frequency, or hematuria  NEUROLOGICAL: no focal weakness or dizziness  MUSCULOSKELETAL: no myalgias or joint pains    Vital Signs Last 24 Hrs  T(C): 36.6 (26 Mar 2021 13:06), Max: 36.7 (26 Mar 2021 00:44)  T(F): 97.9 (26 Mar 2021 13:06), Max: 98 (26 Mar 2021 00:44)  HR: 76 (26 Mar 2021 13:06) (64 - 83)  BP: 117/62 (26 Mar 2021 13:06) (117/62 - 135/84)  BP(mean): --  RR: 16 (26 Mar 2021 13:06) (16 - 17)  SpO2: 97% (26 Mar 2021 13:06) (97% - 99%)    PHYSICAL EXAM:  GENERAL: NAD, sitting up in bed comfortably; yellow appearing skin throughout  HEENT:  icteric, moist mucous membranes  CHEST/LUNG:  CTA b/l, no rales, wheezes, or rhonchi  HEART:  RRR, S1, S2  ABDOMEN:  BS+, soft, nontender, nondistended  EXTREMITIES: no edema, cyanosis, or calf tenderness  NERVOUS SYSTEM: answers questions and follows commands appropriately    LABS:                        12.2   6.13  )-----------( 159      ( 26 Mar 2021 06:30 )             35.6     CBC Full  -  ( 26 Mar 2021 06:30 )  WBC Count : 6.13 K/uL  Hemoglobin : 12.2 g/dL  Hematocrit : 35.6 %  Platelet Count - Automated : 159 K/uL  Mean Cell Volume : 89.2 fl  Mean Cell Hemoglobin : 30.6 pg  Mean Cell Hemoglobin Concentration : 34.3 gm/dL  Auto Neutrophil # : 3.23 K/uL  Auto Lymphocyte # : 1.95 K/uL  Auto Monocyte # : 0.63 K/uL  Auto Eosinophil # : 0.27 K/uL  Auto Basophil # : 0.03 K/uL  Auto Neutrophil % : 52.7 %  Auto Lymphocyte % : 31.8 %  Auto Monocyte % : 10.3 %  Auto Eosinophil % : 4.4 %  Auto Basophil % : 0.5 %    26 Mar 2021 06:30    141    |  108    |  12     ----------------------------<  74     3.8     |  28     |  0.74     Ca    9.0        26 Mar 2021 06:30    TPro  6.3    /  Alb  3.2    /  TBili  14.4   /  DBili  11.40  /  AST  1906   /  ALT  2302   /  AlkPhos  562    26 Mar 2021 06:30    PT/INR - ( 25 Mar 2021 13:42 )   PT: 12.6 sec;   INR: 1.08 ratio         PTT - ( 25 Mar 2021 13:42 )  PTT:37.1 sec  Urinalysis Basic - ( 26 Mar 2021 12:07 )    Color: Yellow / Appearance: Clear / S.005 / pH: x  Gluc: x / Ketone: Small  / Bili: Small / Urobili: 1   Blood: x / Protein: Negative / Nitrite: Negative   Leuk Esterase: Small / RBC: 0-2 /HPF / WBC 6-10   Sq Epi: x / Non Sq Epi: Occasional / Bacteria: Few      CAPILLARY BLOOD GLUCOSE      RADIOLOGY & ADDITIONAL TESTS:  < from: CT Abdomen and Pelvis w/ IV Cont (21 @ 15:42) >    EXAM:  CT ABDOMEN AND PELVIS IC                            PROCEDURE DATE:  2021          INTERPRETATION:  CLINICAL INFORMATION: Abdominal pain. Jaundice.    COMPARISON: None.    CONTRAST/COMPLICATIONS:  IV Contrast: Omnipaque 350  90 cc administered   10 cc discarded  Oral Contrast: NONE  Complications: None reported at time of study completion    PROCEDURE:  CT of the Abdomen and Pelvis was performed.  Sagittal and coronal reformats were performed.    FINDINGS:    LOWER CHEST: There is a 2.2 cm low-density lesion anterior right hepatic lobe with CT attenuation values compatible with hepatic cysts.  There are multiple smaller, indeterminate hypodense hepatic lesions.  There is periportal edema.    LIVER: Within normal limits.  BILE DUCTS: Normal caliber.  GALLBLADDER: Gallbladder wall edema.  SPLEEN: Within normal limits.  PANCREAS: Within normal limits.  ADRENALS: Within normal limits.  KIDNEYS/URETERS: Within normal limits.    BLADDER: Distended, correlate with urine output.  REPRODUCTIVE ORGANS: Bulky coarse calcified uterine fibroids.    BOWEL: Colonic distention with fecal retention.  Moderately distended, fluid-filled stomach. There is fluid filled duodenum with bowel wall thickening involving the descending and transverse portion of the duodenum extending to the duodenojejunal junction. Recommend correlation for duodenitis.  Appendix normal.  PERITONEUM: No ascites.    VESSELS: Atherosclerotic calcification, abdominal aorta is normal in caliber.  RETROPERITONEUM/LYMPH NODES: No enlarged lymphadenopathy.  ABDOMINAL WALL: Within normal limits.    BONES: Degenerative changes spine.    IMPRESSION:    Gallbladder wall edema and periportal edema.  Findings nonspecific, correlate clinically for acute viral hepatitis.    Right hepatic cyst with multiple indeterminate hypodense hepatic lesions.  Recommend further after characterization with nonemergent MRI if there are no clinical contraindications.        Personally reviewed.     Consultant(s) Notes Reviewed:  [x] YES  [ ] NO

## 2021-03-26 NOTE — DISCHARGE NOTE PROVIDER - NSDCFUADDAPPT_GEN_ALL_CORE_FT
LAB WORK NEEDED WITHIN THREE DAYS OF DISCHARGE: Please call your primary doctor to get a referral for lab work in order to obtain repeat Comprehensive Metabolic Panel to  your Liver Function Studies, a Hepatic Function Panel to check your total, indirect and direct bilirubin levels and a Lipid Panel to check your cholesterol levels.

## 2021-03-26 NOTE — DISCHARGE NOTE PROVIDER - NSDCCPCAREPLAN_GEN_ALL_CORE_FT
PRINCIPAL DISCHARGE DIAGNOSIS  Diagnosis: Acute hepatitis  Assessment and Plan of Treatment: You were admitted to the hospital for yellowing of your skin and eyes, found to have acute hepatitis which is an inflammation of your liver. You were checked for possible viral sources such as Hepatitis A, B, C however these tests were found to be negative, along with autoimmune causes and tumors/malignancy which were also negative. You had a CT scan of your Abdomen and Pelvis along with an MRI of your abdomen which showed no obstruction of your biliary system. Only finding was some nonspecific liver inflammation and gallbladder wall inflammation. GI and Infectious disease doctors were consulted and followed your throughout your hospital course. It was determined that your turmeric supplements were likely the cause of your drug induced hepatitis as we recommend you STOP these pills immediately. Please also discontinue the use of your statin medication as this could worsen your liver inflammation.  Please follow up with your GI doctor and PCP within 1-2 weeks of discharge.      SECONDARY DISCHARGE DIAGNOSES  Diagnosis: Abnormal iron saturation  Assessment and Plan of Treatment: You were found to have elevated iron and ferritin levels of which the cause is unknown. This could be related to a condition called hemochromatosis which is a chronic and genetic disorder of iron metabolism in your body. Please follow up with your PCP along with a GI and Hematologist after hospital discharge for outpatient management/workup.    Diagnosis: Hyperlipidemia  Assessment and Plan of Treatment: Please discontinue use of your statin medication and follow up with your PCP.    Diagnosis: Hypothyroid  Assessment and Plan of Treatment: Continue your Levothyroxine home medication as prescribed.     PRINCIPAL DISCHARGE DIAGNOSIS  Diagnosis: Acute hepatitis  Assessment and Plan of Treatment: You were admitted to the hospital for yellowing of your skin and eyes, found to have acute hepatitis which is an inflammation of your liver. You were checked for possible viral sources such as Hepatitis A, B, C however these tests were found to be negative, along with autoimmune causes and tumors/malignancy which were also negative. You had a CT scan of your Abdomen and Pelvis along with an MRI of your abdomen which showed no obstruction of your biliary system. Only finding was some nonspecific liver inflammation and gallbladder wall inflammation. GI and Infectious disease doctors were consulted and followed you throughout your hospital course. It was determined that your turmeric supplements were likely the cause of your drug induced hepatitis.   STOP taking tumeric pills immediately.   STOP taking your other supplements for now and revisit starting these supplements with your primary care provider.   STOP taking your statin medication as this could worsen your liver inflammation. We recommend to obtain a lipid panel to assess your cholesterol levels and determine the need for statins.  START taking Ursodiol 2 times a day as you can tolerate. If you feel any stomach upset, it is OKAY to skip a dose of Ursodiol.   AVOID drinking alcohol for at least 2 weeks after discharge to avoid any harm to your liver.  Please follow up with GI doctor Dr. Diaz and your primary care doctor within 1 week of discharge. Dr. Diaz' office information is provided below.   Also provided below is another primary care provider's office Dr. Laureano where the [place] residents work if you would like to switch your primary provider.  LAB WORK NEEDED WITHIN THREE DAYS OF DISCHARGE:  Please call your primary doctor to get a referral for lab work in order to obtain repeat Comprehensive Metabolic Panel to  your Liver Function Studies, a Hepatic Function Panel to check your total, indirect and direct bilirubin levels and a Lipid Panel to check your cholesterol levels.      SECONDARY DISCHARGE DIAGNOSES  Diagnosis: Abnormal iron saturation  Assessment and Plan of Treatment: You were found to have elevated iron and ferritin levels of which the cause is unknown. This could be related to a condition called hemochromatosis which is a chronic and genetic disorder of iron metabolism in your body. Ferritin can also be elevated if our body has an acute inflammatory process.   Please follow up with our hematologist Dr. Hon TAD Peterson within 1 month after hospital discharge for repeat iron studies to assess your ferriting level.  Dr. Peterson's office information is provided below.    Diagnosis: Hyperlipidemia  Assessment and Plan of Treatment: Please discontinue use of your statin medication and follow up with your PCP.    Diagnosis: Hypothyroid  Assessment and Plan of Treatment: Continue your Levothyroxine home medication as prescribed.    Diagnosis: Nasal polyps  Assessment and Plan of Treatment: Continue taking your home fluticasone, montelukast and chlorpheniramine as prescribed.     PRINCIPAL DISCHARGE DIAGNOSIS  Diagnosis: Acute hepatitis  Assessment and Plan of Treatment: You were admitted to the hospital for yellowing of your skin and eyes, found to have acute hepatitis which is an inflammation of your liver. You were checked for possible viral sources such as Hepatitis A, B, C however these tests were found to be negative, along with autoimmune causes were also negative. You had a CT scan of your Abdomen and Pelvis along with an MRI of your abdomen. Only finding was some nonspecific liver inflammation and gallbladder wall inflammation. GI and Infectious disease doctors were consulted and followed you throughout your hospital course. It was determined that your turmeric supplements were likely the cause of your drug induced hepatitis.   STOP taking tumeric pills immediately.   STOP taking your other supplements for now and revisit starting these supplements with your primary care provider.   STOP taking your statin medication as this could worsen your liver inflammation. We recommend to obtain a lipid panel to assess your cholesterol levels and determine the need for statins.  START taking Ursodiol 2 times a day as you can tolerate. If you feel any stomach upset, it is OKAY to skip a dose of Ursodiol.   AVOID drinking alcohol until you speak to a medical provider that monitors your liver enzymes.  Please follow up with GI doctor Dr. Diaz and your primary care doctor within 1 week of discharge. Dr. Diaz' office information is provided below.   Also provided below is another primary care provider's office Dr. Laureano where the [place] residents work if you would like to switch your primary provider.  LAB WORK NEEDED WITHIN THREE DAYS OF DISCHARGE:  Please call your primary doctor to get a referral for lab work in order to obtain repeat Comprehensive Metabolic Panel to  your Liver Function Studies, a Hepatic Function Panel to check your total, indirect and direct bilirubin levels and a Lipid Panel to check your cholesterol levels.  In about 2 weeks, we recommend that you REPEAT HEPATITIS PANEL.      SECONDARY DISCHARGE DIAGNOSES  Diagnosis: Abnormal iron saturation  Assessment and Plan of Treatment: You were found to have MILDLY elevated iron % sat and ferritin levels.   Repeat ferritin level decreased.  Please follow up with our hematologist Dr. Hon TAD Peterson within 1 month after hospital discharge for repeat iron studies to assess your ferriting level.  Dr. Peterson's office information is provided below.    Diagnosis: Hyperlipidemia  Assessment and Plan of Treatment: Please discontinue use of your statin medication for now and follow up with your PCP.    Diagnosis: Hypothyroid  Assessment and Plan of Treatment: Continue your Levothyroxine home medication as prescribed.    Diagnosis: Nasal polyps  Assessment and Plan of Treatment: Continue taking your home fluticasone, montelukast and chlorpheniramine as prescribed.

## 2021-03-26 NOTE — DISCHARGE NOTE PROVIDER - CARE PROVIDER_API CALL
Fernandez Diaz (DO)  Internal Medicine  [address] [place] [address]  Phone: [phone]  Fax: [phone]  Follow Up Time:     Janneth Patterson  INTERNAL MEDICINE  [address] [place] [address]  Phone: [phone]  Fax: [phone]  Follow Up Time:     Juan Pablo Manuel  [facility]  [address] [facility], [place] [address]  Phone: [phone]  Fax: [phone]  Follow Up Time:    Fernandez Diaz (DO)  Internal Medicine  [address] [place] [address]  Phone: [phone]  Fax: [phone]  Follow Up Time: 1 week    Janneth Patterson  INTERNAL MEDICINE  [address] [place] [address]  Phone: [phone]  Fax: [phone]  Follow Up Time: 1 week    Hon TAD Peterson (MD)  Hematology; Internal Medicine; Medical Oncology  [address] [facility], [address]  [place] [address]  Phone: [phone]  Fax: [phone]  Follow Up Time: 1 month    Devyn Laureano (DO)  Family Medicine  [address] [facility]  [place] [address]  Phone: [phone]  Fax: [phone]  Follow Up Time:

## 2021-03-26 NOTE — DISCHARGE NOTE PROVIDER - NSDCFUADDINST_GEN_ALL_CORE_FT
Avoid alcohol for the next two weeks to reduce your inflammation in the liver.   Avoid taking ALL supplements at this time. You can speak with your PCP about restarting your medications.

## 2021-03-26 NOTE — PROGRESS NOTE ADULT - PROBLEM SELECTOR PLAN 5
vte ppx heparin     IMPROVE VTE Individual Risk Assessment        RISK                                                          Points  [  ] Previous VTE                                                3  [  ] Thrombophilia                                             2  [  ] Lower limb paralysis                                   2        (unable to hold up >15 seconds)    [  ] Current Cancer                                            2         (within 6 months)  [  ] Immobilization > 24 hrs                              1  [  ] ICU/CCU stay > 24 hours                            1  [  ] Age > 60                                                    1  IMPROVE VTE Score ____1_____ hx of nasal polyps, allergies  takes montelukast 10 mg qD at home, start Claritin qD as interchange   continue home fluticasone 1 spray each nostril qD

## 2021-03-27 DIAGNOSIS — B17.9 ACUTE VIRAL HEPATITIS, UNSPECIFIED: ICD-10-CM

## 2021-03-27 LAB
ALBUMIN SERPL ELPH-MCNC: 3 G/DL — LOW (ref 3.3–5)
ALP SERPL-CCNC: 561 U/L — HIGH (ref 40–120)
ALT FLD-CCNC: 1966 U/L — HIGH (ref 12–78)
ANION GAP SERPL CALC-SCNC: 7 MMOL/L — SIGNIFICANT CHANGE UP (ref 5–17)
APTT BLD: 51.2 SEC — HIGH (ref 27.5–35.5)
AST SERPL-CCNC: 1414 U/L — HIGH (ref 15–37)
BILIRUB DIRECT SERPL-MCNC: 10.7 MG/DL — HIGH (ref 0.05–0.2)
BILIRUB INDIRECT FLD-MCNC: 2.7 MG/DL — HIGH (ref 0.2–1)
BILIRUB SERPL-MCNC: 13.4 MG/DL — HIGH (ref 0.2–1.2)
BUN SERPL-MCNC: 10 MG/DL — SIGNIFICANT CHANGE UP (ref 7–23)
CALCIUM SERPL-MCNC: 9.1 MG/DL — SIGNIFICANT CHANGE UP (ref 8.5–10.1)
CHLORIDE SERPL-SCNC: 109 MMOL/L — HIGH (ref 96–108)
CMV IGG FLD QL: <0.2 U/ML — SIGNIFICANT CHANGE UP
CMV IGG SERPL-IMP: NEGATIVE — SIGNIFICANT CHANGE UP
CMV IGM FLD-ACNC: <8 AU/ML — SIGNIFICANT CHANGE UP
CMV IGM SERPL QL: NEGATIVE — SIGNIFICANT CHANGE UP
CO2 SERPL-SCNC: 25 MMOL/L — SIGNIFICANT CHANGE UP (ref 22–31)
CREAT SERPL-MCNC: 0.69 MG/DL — SIGNIFICANT CHANGE UP (ref 0.5–1.3)
GLUCOSE SERPL-MCNC: 75 MG/DL — SIGNIFICANT CHANGE UP (ref 70–99)
HCT VFR BLD CALC: 34.6 % — SIGNIFICANT CHANGE UP (ref 34.5–45)
HGB BLD-MCNC: 11.8 G/DL — SIGNIFICANT CHANGE UP (ref 11.5–15.5)
HIV 1+2 AB+HIV1 P24 AG SERPL QL IA: SIGNIFICANT CHANGE UP
INR BLD: 1.08 RATIO — SIGNIFICANT CHANGE UP (ref 0.88–1.16)
LKM AB SER-ACNC: <20.1 UNITS — SIGNIFICANT CHANGE UP (ref 0–20)
MCHC RBC-ENTMCNC: 30.7 PG — SIGNIFICANT CHANGE UP (ref 27–34)
MCHC RBC-ENTMCNC: 34.1 GM/DL — SIGNIFICANT CHANGE UP (ref 32–36)
MCV RBC AUTO: 90.1 FL — SIGNIFICANT CHANGE UP (ref 80–100)
NRBC # BLD: 0 /100 WBCS — SIGNIFICANT CHANGE UP (ref 0–0)
PLATELET # BLD AUTO: 154 K/UL — SIGNIFICANT CHANGE UP (ref 150–400)
POTASSIUM SERPL-MCNC: 4.1 MMOL/L — SIGNIFICANT CHANGE UP (ref 3.5–5.3)
POTASSIUM SERPL-SCNC: 4.1 MMOL/L — SIGNIFICANT CHANGE UP (ref 3.5–5.3)
PROT SERPL-MCNC: 6.1 G/DL — SIGNIFICANT CHANGE UP (ref 6–8.3)
PROTHROM AB SERPL-ACNC: 12.6 SEC — SIGNIFICANT CHANGE UP (ref 10.6–13.6)
RBC # BLD: 3.84 M/UL — SIGNIFICANT CHANGE UP (ref 3.8–5.2)
RBC # FLD: 17.8 % — HIGH (ref 10.3–14.5)
SODIUM SERPL-SCNC: 141 MMOL/L — SIGNIFICANT CHANGE UP (ref 135–145)
T4 FREE SERPL-MCNC: 1.5 NG/DL — SIGNIFICANT CHANGE UP (ref 0.9–1.8)
TSH SERPL-MCNC: 3.58 UIU/ML — SIGNIFICANT CHANGE UP (ref 0.36–3.74)
VZV IGM SER-ACNC: <0.91 INDEX — SIGNIFICANT CHANGE UP (ref 0–0.9)
WBC # BLD: 5.42 K/UL — SIGNIFICANT CHANGE UP (ref 3.8–10.5)
WBC # FLD AUTO: 5.42 K/UL — SIGNIFICANT CHANGE UP (ref 3.8–10.5)

## 2021-03-27 PROCEDURE — 99233 SBSQ HOSP IP/OBS HIGH 50: CPT | Mod: GC

## 2021-03-27 RX ORDER — POLYETHYLENE GLYCOL 3350 17 G/17G
17 POWDER, FOR SOLUTION ORAL
Refills: 0 | Status: DISCONTINUED | OUTPATIENT
Start: 2021-03-27 | End: 2021-03-28

## 2021-03-27 RX ORDER — URSODIOL 250 MG/1
300 TABLET, FILM COATED ORAL EVERY 12 HOURS
Refills: 0 | Status: DISCONTINUED | OUTPATIENT
Start: 2021-03-27 | End: 2021-03-30

## 2021-03-27 RX ORDER — SODIUM CHLORIDE 9 MG/ML
1000 INJECTION INTRAMUSCULAR; INTRAVENOUS; SUBCUTANEOUS
Refills: 0 | Status: DISCONTINUED | OUTPATIENT
Start: 2021-03-27 | End: 2021-03-28

## 2021-03-27 RX ORDER — SENNA PLUS 8.6 MG/1
2 TABLET ORAL AT BEDTIME
Refills: 0 | Status: DISCONTINUED | OUTPATIENT
Start: 2021-03-27 | End: 2021-03-28

## 2021-03-27 RX ADMIN — HEPARIN SODIUM 5000 UNIT(S): 5000 INJECTION INTRAVENOUS; SUBCUTANEOUS at 17:31

## 2021-03-27 RX ADMIN — SODIUM CHLORIDE 50 MILLILITER(S): 9 INJECTION INTRAMUSCULAR; INTRAVENOUS; SUBCUTANEOUS at 12:18

## 2021-03-27 RX ADMIN — Medication 25 MICROGRAM(S): at 05:43

## 2021-03-27 RX ADMIN — HEPARIN SODIUM 5000 UNIT(S): 5000 INJECTION INTRAVENOUS; SUBCUTANEOUS at 05:43

## 2021-03-27 RX ADMIN — POLYETHYLENE GLYCOL 3350 17 GRAM(S): 17 POWDER, FOR SOLUTION ORAL at 17:32

## 2021-03-27 RX ADMIN — Medication 1 SPRAY(S): at 17:31

## 2021-03-27 NOTE — PROGRESS NOTE ADULT - ATTENDING COMMENTS
admitted for work up of painless jaundice  - continue IV hydration  - dc turmeric  - Improving Bili, monitor liver enzymes admitted for work up of painless jaundice  - continue IV hydration  - dc turmeric  - Improving Bili, monitor liver enzymes  - high ferritin, likely acute phase, but in setting of high % iron sat, will follow. Lower suspicion for hemochromatosis but will c/s hematology for input.

## 2021-03-27 NOTE — PROGRESS NOTE ADULT - SUBJECTIVE AND OBJECTIVE BOX
Patient is a 75y old  Female who presents with a chief complaint of jaundice (26 Mar 2021 16:46)      INTERVAL HPI/OVERNIGHT EVENTS: No acute events overnight. Patient seen and examined at bedside. States she feels well and has no complaints of pain, fevers, loss of appetite.     MEDICATIONS  (STANDING):  fluticasone propionate 50 MICROgram(s)/spray Nasal Spray 1 Spray(s) Both Nostrils <User Schedule>  heparin   Injectable 5000 Unit(s) SubCutaneous every 12 hours  levothyroxine 25 MICROGram(s) Oral daily  loratadine 10 milliGRAM(s) Oral daily  montelukast 10 milliGRAM(s) Oral daily  polyethylene glycol 3350 17 Gram(s) Oral two times a day  senna 2 Tablet(s) Oral at bedtime    MEDICATIONS  (PRN):      Allergies    aspirin (Rash)    Intolerances        REVIEW OF SYSTEMS:  CONSTITUTIONAL: No fever or chills  HEENT:  No headache, no sore throat  RESPIRATORY: No cough, wheezing, or shortness of breath  CARDIOVASCULAR: No chest pain, palpitations  GASTROINTESTINAL: +jaundice, No abd pain, nausea, vomiting, diarrhea, or weight loss  GENITOURINARY: No dysuria, frequency, or hematuria  NEUROLOGICAL: no focal weakness or dizziness  MUSCULOSKELETAL: no myalgias or joint pains      Vital Signs Last 24 Hrs  T(C): 36.6 (27 Mar 2021 04:28), Max: 36.8 (26 Mar 2021 20:10)  T(F): 97.9 (27 Mar 2021 04:28), Max: 98.2 (26 Mar 2021 20:10)  HR: 72 (27 Mar 2021 04:28) (72 - 78)  BP: 120/67 (27 Mar 2021 04:28) (98/61 - 120/67)  BP(mean): --  RR: 17 (27 Mar 2021 04:28) (16 - 17)  SpO2: 100% (27 Mar 2021 04:28) (96% - 100%)    PHYSICAL EXAM:  GENERAL: NAD, sitting up in chair comfortably; yellow appearing skin throughout  HEENT:  icteric, moist mucous membranes  CHEST/LUNG:  CTA b/l, no rales, wheezes, or rhonchi  HEART:  RRR, S1, S2  ABDOMEN:  BS+, soft, nontender, nondistended  EXTREMITIES: no edema, cyanosis, or calf tenderness  NERVOUS SYSTEM: answers questions and follows commands appropriately    LABS:                        11.8   5.42  )-----------( 154      ( 27 Mar 2021 06:37 )             34.6     CBC Full  -  ( 27 Mar 2021 06:37 )  WBC Count : 5.42 K/uL  Hemoglobin : 11.8 g/dL  Hematocrit : 34.6 %  Platelet Count - Automated : 154 K/uL  Mean Cell Volume : 90.1 fl  Mean Cell Hemoglobin : 30.7 pg  Mean Cell Hemoglobin Concentration : 34.1 gm/dL  Auto Neutrophil # : x  Auto Lymphocyte # : x  Auto Monocyte # : x  Auto Eosinophil # : x  Auto Basophil # : x  Auto Neutrophil % : x  Auto Lymphocyte % : x  Auto Monocyte % : x  Auto Eosinophil % : x  Auto Basophil % : x    27 Mar 2021 06:37    141    |  109    |  10     ----------------------------<  75     4.1     |  25     |  0.69     Ca    9.1        27 Mar 2021 06:37    TPro  6.1    /  Alb  3.0    /  TBili  13.4   /  DBili  10.70  /  AST  1414   /  ALT  1966   /  AlkPhos  561    27 Mar 2021 06:37    PT/INR - ( 27 Mar 2021 06:37 )   PT: 12.6 sec;   INR: 1.08 ratio         PTT - ( 27 Mar 2021 06:37 )  PTT:51.2 sec  Urinalysis Basic - ( 26 Mar 2021 12:07 )    Color: Yellow / Appearance: Clear / S.005 / pH: x  Gluc: x / Ketone: Small  / Bili: Small / Urobili: 1   Blood: x / Protein: Negative / Nitrite: Negative   Leuk Esterase: Small / RBC: 0-2 /HPF / WBC 6-10   Sq Epi: x / Non Sq Epi: Occasional / Bacteria: Few      CAPILLARY BLOOD GLUCOSE      RADIOLOGY & ADDITIONAL TESTS:  < from: MR Abdomen w/wo IV Cont (21 @ 14:28) >    EXAM:  MR ABDOMEN WAW IC                            PROCEDURE DATE:  2021          INTERPRETATION:  CLINICAL INFORMATION: Jaundice elevated LFTs    COMPARISON: None.    CONTRAST/COMPLICATIONS:  IV Contrast: Gadavist  5.5 cc administered   2 cc discarded  Oral Contrast: NONE  Complications: None reported at time of study completion    MRCP and abdominal MR multisequence.  No gallstones. Nonspecific pericholecystic and periportal edema may be reactive secondary to hepatocellular disease/infection, correlate for viral hepatitis. No biliary dilatation or common duct stone. Liver slightly enlarged demonstrates multiple cysts, otherwise normal signal behavior. Pancreas spleen adrenals and kidneys unremarkable.  No ascites.    IMPRESSION:    Nonspecific pericholecystic and periportal edema likely reactive.  Mildly enlarged liver. Correlate for possible viral hepatitis  No obstructive biliary pathology        Personally reviewed.     Consultant(s) Notes Reviewed:  [x] YES  [ ] NO

## 2021-03-27 NOTE — PROGRESS NOTE ADULT - PROBLEM SELECTOR PLAN 2
-elevated ferritin 3118, likely elevated as an acute phase reactant however possible hemochromatosis  -iron elevated 224, %sat elevated 53  -will follow up with GI regarding this matter

## 2021-03-27 NOTE — PROGRESS NOTE ADULT - PROBLEM SELECTOR PLAN 3
-continue home levothyroxine 25 mg qD   -TSH repeat in range 3.8, likely elevated on admission in setting of acute illness  -continue home dose meds

## 2021-03-27 NOTE — PROGRESS NOTE ADULT - PROBLEM SELECTOR PLAN 1
-painless jaundice, yellow urine, scleral icterics since Monday, +Courvoisier's sign, 2/2 hepatitis possibly drug induced vs viral vs ?neoplastic vs ?autoimmune  -CT abd and pelvis gallbladder wall edema and periportal edema. Right hepatic cyst with multiple indeterminate hypodense hepatic lesions.  -MR abdomen with and without IV contrast shows Nonspecific pericholecystic and periportal edema likely reactive. Mildly enlarged liver. Correlate for possible viral hepatitis. 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  -trend hepatic panel, liver enzymes, bilirubin, PT/INR  -hold home rosuvastatin, hold hepatotoxins   -tolerating regular diet  -Ca-19 negative  -ceruloplasmin negative, Alpha 1 antitrypsin borderline high, likely insignificant in this scenario  -GI (Dr. Rowley) following, recs appreciated  -ID (Dr. Delgado) following, recs appreciated

## 2021-03-27 NOTE — PROGRESS NOTE ADULT - PROBLEM SELECTOR PLAN 5
hx of nasal polyps, allergies  takes montelukast 10 mg qD at home, start Claritin qD as interchange   continue home fluticasone 1 spray each nostril qD

## 2021-03-27 NOTE — PROGRESS NOTE ADULT - SUBJECTIVE AND OBJECTIVE BOX
INTERVAL HPI/OVERNIGHT EVENTS:  pt seen and examined  offers no gi complaints  labs/imaging noted      MEDICATIONS  (STANDING):  fluticasone propionate 50 MICROgram(s)/spray Nasal Spray 1 Spray(s) Both Nostrils <User Schedule>  heparin   Injectable 5000 Unit(s) SubCutaneous every 12 hours  levothyroxine 25 MICROGram(s) Oral daily  loratadine 10 milliGRAM(s) Oral daily  montelukast 10 milliGRAM(s) Oral daily  polyethylene glycol 3350 17 Gram(s) Oral two times a day  senna 2 Tablet(s) Oral at bedtime  sodium chloride 0.9%. 1000 milliLiter(s) (50 mL/Hr) IV Continuous <Continuous>    MEDICATIONS  (PRN):      Allergies    aspirin (Rash)    Intolerances        Review of Systems:    General:  No wt loss, fevers, chills, night sweats, fatigue   Eyes:  Good vision, no reported pain  ENT:  No sore throat, pain, runny nose, dysphagia  CV:  No pain, palpitations, hypo/hypertension  Resp:  No dyspnea, cough, tachypnea, wheezing  GI:  No pain, No nausea, No vomiting, No diarrhea, No constipation, No weight loss, No fever, No pruritis, No rectal bleeding, No melena, No dysphagia  :  No pain, bleeding, incontinence, nocturia  Muscle:  No pain, weakness  Neuro:  No weakness, tingling, memory problems  Psych:  No fatigue, insomnia, mood problems, depression  Endocrine:  No polyuria, polydypsia, cold/heat intolerance  Heme:  No petechiae, ecchymosis, easy bruisability  Skin:  jaundice       Vital Signs Last 24 Hrs  T(C): 36.6 (27 Mar 2021 04:28), Max: 36.8 (26 Mar 2021 20:10)  T(F): 97.9 (27 Mar 2021 04:28), Max: 98.2 (26 Mar 2021 20:10)  HR: 72 (27 Mar 2021 04:28) (72 - 78)  BP: 120/67 (27 Mar 2021 04:28) (98/61 - 120/67)  BP(mean): --  RR: 17 (27 Mar 2021 04:28) (16 - 17)  SpO2: 100% (27 Mar 2021 04:28) (96% - 100%)      PHYSICAL EXAM:  General:  lying in bed in nad  HEENT:  NC/AT,  scleral icterus  Abdomen:  Soft, non-tender, non-distended  Extremities:  no edema  Skin:  diffuse jaundice  Neuro/Psych:  A&Ox3        LABS:                        11.8   5.42  )-----------( 154      ( 27 Mar 2021 06:37 )             34.6     03-27    141  |  109<H>  |  10  ----------------------------<  75  4.1   |  25  |  0.69    Ca    9.1      27 Mar 2021 06:37    TPro  6.1  /  Alb  3.0<L>  /  TBili  13.4<H>  /  DBili  10.70<H>  /  AST  1414<H>  /  ALT  1966<H>  /  AlkPhos  561<H>  03-27    PT/INR - ( 27 Mar 2021 06:37 )   PT: 12.6 sec;   INR: 1.08 ratio         PTT - ( 27 Mar 2021 06:37 )  PTT:51.2 sec                  RADIOLOGY & ADDITIONAL TESTS:  < from: CT Abdomen and Pelvis w/ IV Cont (03.25.21 @ 15:42) >    EXAM:  CT ABDOMEN AND PELVIS IC                            PROCEDURE DATE:  03/25/2021          INTERPRETATION:  CLINICAL INFORMATION: Abdominal pain. Jaundice.    COMPARISON: None.    CONTRAST/COMPLICATIONS:  IV Contrast: Omnipaque 350  90 cc administered   10 cc discarded  Oral Contrast: NONE  Complications: None reported at time of study completion    PROCEDURE:  CT of the Abdomen and Pelvis was performed.  Sagittal and coronal reformats were performed.    FINDINGS:    LOWER CHEST: There is a 2.2 cm low-density lesion anterior right hepatic lobe with CT attenuation values compatible with hepatic cysts.  There are multiple smaller, indeterminate hypodense hepatic lesions.  There is periportal edema.    LIVER: Within normal limits.  BILE DUCTS: Normal caliber.  GALLBLADDER: Gallbladder wall edema.  SPLEEN: Within normal limits.  PANCREAS: Within normal limits.  ADRENALS: Within normal limits.  KIDNEYS/URETERS: Within normal limits.    BLADDER: Distended, correlate with urine output.  REPRODUCTIVE ORGANS: Bulky coarse calcified uterine fibroids.    BOWEL: Colonic distention with fecal retention.  Moderately distended, fluid-filled stomach. There is fluid filled duodenum with bowel wall thickening involving the descending and transverse portion of the duodenum extending to the duodenojejunal junction. Recommend correlation for duodenitis.  Appendix normal.  PERITONEUM: No ascites.    VESSELS: Atherosclerotic calcification, abdominal aorta is normal in caliber.  RETROPERITONEUM/LYMPH NODES: No enlarged lymphadenopathy.  ABDOMINAL WALL: Within normal limits.    BONES: Degenerative changes spine.    IMPRESSION:    Gallbladder wall edema and periportal edema.  Findings nonspecific, correlate clinically for acute viral hepatitis.    Right hepatic cyst with multiple indeterminate hypodense hepatic lesions.  Recommend further after characterization with nonemergent MRI if there are no clinical contraindications.    Other findings as discussed above.              LOLA CRUZ M.D., ATTENDING RADIOLOGIST  This document has been electronically signed. Mar 25 2021  4:15PM    < end of copied text >

## 2021-03-28 LAB
ALBUMIN SERPL ELPH-MCNC: 3 G/DL — LOW (ref 3.3–5)
ALP SERPL-CCNC: 566 U/L — HIGH (ref 40–120)
ALT FLD-CCNC: 1858 U/L — HIGH (ref 12–78)
ANION GAP SERPL CALC-SCNC: 4 MMOL/L — LOW (ref 5–17)
APTT BLD: 45.3 SEC — HIGH (ref 27.5–35.5)
AST SERPL-CCNC: 1222 U/L — HIGH (ref 15–37)
B19V IGG SER-ACNC: 5.3 INDEX — HIGH (ref 0–0.9)
B19V IGG+IGM SER-IMP: POSITIVE
B19V IGG+IGM SER-IMP: SIGNIFICANT CHANGE UP
B19V IGM FLD-ACNC: 0.24 INDEX — SIGNIFICANT CHANGE UP (ref 0–0.9)
B19V IGM SER-ACNC: NEGATIVE — SIGNIFICANT CHANGE UP
BILIRUB DIRECT SERPL-MCNC: 10.8 MG/DL — HIGH (ref 0.05–0.2)
BILIRUB INDIRECT FLD-MCNC: 2.5 MG/DL — HIGH (ref 0.2–1)
BILIRUB SERPL-MCNC: 13.3 MG/DL — HIGH (ref 0.2–1.2)
BUN SERPL-MCNC: 9 MG/DL — SIGNIFICANT CHANGE UP (ref 7–23)
CALCIUM SERPL-MCNC: 9.1 MG/DL — SIGNIFICANT CHANGE UP (ref 8.5–10.1)
CHLORIDE SERPL-SCNC: 109 MMOL/L — HIGH (ref 96–108)
CO2 SERPL-SCNC: 28 MMOL/L — SIGNIFICANT CHANGE UP (ref 22–31)
CREAT SERPL-MCNC: 0.8 MG/DL — SIGNIFICANT CHANGE UP (ref 0.5–1.3)
FERRITIN SERPL-MCNC: 2436 NG/ML — HIGH (ref 15–150)
GLUCOSE SERPL-MCNC: 85 MG/DL — SIGNIFICANT CHANGE UP (ref 70–99)
HCT VFR BLD CALC: 36 % — SIGNIFICANT CHANGE UP (ref 34.5–45)
HGB BLD-MCNC: 12.2 G/DL — SIGNIFICANT CHANGE UP (ref 11.5–15.5)
INR BLD: 1.04 RATIO — SIGNIFICANT CHANGE UP (ref 0.88–1.16)
MCHC RBC-ENTMCNC: 30.6 PG — SIGNIFICANT CHANGE UP (ref 27–34)
MCHC RBC-ENTMCNC: 33.9 GM/DL — SIGNIFICANT CHANGE UP (ref 32–36)
MCV RBC AUTO: 90.2 FL — SIGNIFICANT CHANGE UP (ref 80–100)
NRBC # BLD: 0 /100 WBCS — SIGNIFICANT CHANGE UP (ref 0–0)
PLATELET # BLD AUTO: 179 K/UL — SIGNIFICANT CHANGE UP (ref 150–400)
POTASSIUM SERPL-MCNC: 4.4 MMOL/L — SIGNIFICANT CHANGE UP (ref 3.5–5.3)
POTASSIUM SERPL-SCNC: 4.4 MMOL/L — SIGNIFICANT CHANGE UP (ref 3.5–5.3)
PROT SERPL-MCNC: 6.4 G/DL — SIGNIFICANT CHANGE UP (ref 6–8.3)
PROTHROM AB SERPL-ACNC: 12.2 SEC — SIGNIFICANT CHANGE UP (ref 10.6–13.6)
RBC # BLD: 3.99 M/UL — SIGNIFICANT CHANGE UP (ref 3.8–5.2)
RBC # FLD: 18.2 % — HIGH (ref 10.3–14.5)
SODIUM SERPL-SCNC: 141 MMOL/L — SIGNIFICANT CHANGE UP (ref 135–145)
WBC # BLD: 6.07 K/UL — SIGNIFICANT CHANGE UP (ref 3.8–10.5)
WBC # FLD AUTO: 6.07 K/UL — SIGNIFICANT CHANGE UP (ref 3.8–10.5)

## 2021-03-28 PROCEDURE — 99232 SBSQ HOSP IP/OBS MODERATE 35: CPT | Mod: GC

## 2021-03-28 RX ORDER — SODIUM CHLORIDE 9 MG/ML
1000 INJECTION INTRAMUSCULAR; INTRAVENOUS; SUBCUTANEOUS
Refills: 0 | Status: DISCONTINUED | OUTPATIENT
Start: 2021-03-28 | End: 2021-03-29

## 2021-03-28 RX ADMIN — HEPARIN SODIUM 5000 UNIT(S): 5000 INJECTION INTRAVENOUS; SUBCUTANEOUS at 05:52

## 2021-03-28 RX ADMIN — URSODIOL 300 MILLIGRAM(S): 250 TABLET, FILM COATED ORAL at 05:52

## 2021-03-28 RX ADMIN — Medication 1 SPRAY(S): at 17:17

## 2021-03-28 RX ADMIN — HEPARIN SODIUM 5000 UNIT(S): 5000 INJECTION INTRAVENOUS; SUBCUTANEOUS at 17:17

## 2021-03-28 RX ADMIN — POLYETHYLENE GLYCOL 3350 17 GRAM(S): 17 POWDER, FOR SOLUTION ORAL at 05:52

## 2021-03-28 RX ADMIN — Medication 25 MICROGRAM(S): at 05:53

## 2021-03-28 NOTE — CONSULT NOTE ADULT - SUBJECTIVE AND OBJECTIVE BOX
INCOMPLETE NOTE.  Documentation in Progress  PT SEEN AND EVALUATED.   FULL/ADDITIONAL RECOMMENDATIONS TO FOLLOW   ***************************************************************      Patient is a 75y old  Female who presents with a chief complaint of jaundice (28 Mar 2021 11:29)      HPI:  74 YO F PMHX hypothyroidism, HLD, nasal polyps, oA b/l knees, here for jaundice since Monday sent in by PCP for total bilirubin ~15. On Monday pt noticed bright yellow urine, sclera icterus. Pt then got worried and started drinking more water. Pt then took a shower and noticed her chest and stomach was yellow. Pt did not see a PCP until Wednesday because she was not in pain. On Wednesday pt saw Dr. Hammond, at her PCP's office, had bloodwork and UA done. Pt was supposed to to get an RUQ/abdominal US today, however she received a call form Dr. Hammond to immediately go to the ER for a total bilirubin level of ~15.  Of note, pt has been on rosuvastatin for about 6 years, has been taking tumeric 450 mg since December (for oA). Pt denies fatigue, travel, recent illness. Of note, pt's mother had "some type of liver problem". Pt denies fever, chills, CP, SOB, abdominal pain, edema.     Ed vitals 133/72, HR 98, RR 16, afebrile, 100% on RA.   Labs significant for total bilirubin 16.5, alk P 697, AST 2040, ALT 2898, Br 12.9, indirect bilirubin 3.6.   Pt received 1L NS bolus.   CT abd and pelvis gallbladder wall edema and periportal edema. Findings nonspecific, correlate clinically for acute viral hepatitis. Right hepatic cyst with multiple indeterminate hypodense hepatic lesions.     (25 Mar 2021 19:04)        PAST MEDICAL & SURGICAL HISTORY:  Nasal polyps    Allergy    Hyperlipidemia    Hypothyroid    H/O removal of cyst  left 3rd digit    S/P cataract surgery    S/P tonsillectomy         HEALTH ISSUES - PROBLEM Dx:  Acute hepatitis  Acute hepatitis    Abnormal iron saturation  Abnormal iron saturation    Elevated LFTs  Elevated LFTs    Prophylactic measure  Prophylactic measure    Nasal polyps  Nasal polyps    Hyperlipidemia  Hyperlipidemia    Hypothyroid  Hypothyroid    Viral hepatitis  Viral hepatitis    Painless jaundice  Painless jaundice            Viral hepatitis without hepatic coma [B19.9]    Nasal polyps [J33.9]    Allergy [T78.40XA]    Hyperlipidemia [E78.5]    Hypothyroid [E03.9]    H/O removal of cyst [Z98.890]    S/P cataract surgery [Z98.49]    S/P tonsillectomy [Z90.89]    No significant past surgical history [[id number]]    Hypothyroid [E03.9]    Hyperlipidemia [E78.5]    Abnormal iron saturation [R79.0]    Jaundice [R17]        FAMILY HISTORY:        [SOCIAL HISTORY: ]     smoking:       EtOH:       illicit drugs:       occupation:       marital status:       Other:       [ALLERGIES/INTOLERANCES:]  Allergies    aspirin (Rash)    Intolerances          [MEDICATIONS]  MEDICATIONS  (STANDING):  fluticasone propionate 50 MICROgram(s)/spray Nasal Spray 1 Spray(s) Both Nostrils <User Schedule>  heparin   Injectable 5000 Unit(s) SubCutaneous every 12 hours  levothyroxine 25 MICROGram(s) Oral daily  loratadine 10 milliGRAM(s) Oral daily  montelukast 10 milliGRAM(s) Oral daily  sodium chloride 0.9%. 1000 milliLiter(s) (50 mL/Hr) IV Continuous <Continuous>  ursodiol Capsule 300 milliGRAM(s) Oral every 12 hours    MEDICATIONS  (PRN):        [REVIEW OF SYSTEMS: ]  CONSTITUTIONAL: normal, no fever, no shakes, no chills   EYES: No eye pain, no visual disturbances, no discharge  ENMT:  no discharge  NECK: No pain, no stiffness  BREASTS: No pain, no masses, no nipple discharge  RESPIRATORY: No cough, no wheezing, no chills, no hemoptysis; No shortness of breath  CARDIOVASCULAR: No chest pain, no palpitations, no dizziness, no leg swelling  GASTROINTESTINAL: No abdominal, no epigastric pain. No nausea, no vomiting, no hematemesis; No diarrhea , no constipation. No melena, no hematochezia.  GENITOURINARY: No dysuria, no frequency, no hematuria, no incontinence  NEUROLOGICAL: No headaches, no memory loss, no loss of strength, no numbness, no tremors  SKIN: No itching, no burning, no rashes, no lesions   LYMPH NODES: No enlarged glands  ENDOCRINE: No heat or cold intolerance; No hair loss  MUSCULOSKELETAL: No joint pain or swelling; No muscle, no back, no extremity pain  PSYCHIATRIC: No depression, no anxiety, no mood swings, no difficulty sleeping  HEME/LYMPH: No easy bruising, no bleeding gums      [VITALS SIGNS 24hrs]  Vital Signs Last 24 Hrs  T(C): 36.7 (28 Mar 2021 12:47), Max: 36.7 (28 Mar 2021 12:47)  T(F): 98.1 (28 Mar 2021 12:47), Max: 98.1 (28 Mar 2021 12:47)  HR: 63 (28 Mar 2021 12:47) (63 - 71)  BP: 125/73 (28 Mar 2021 12:47) (105/60 - 125/73)  BP(mean): --  RR: 16 (28 Mar 2021 12:47) (16 - 17)  SpO2: 98% (28 Mar 2021 12:47) (95% - 98%)    [PHYSICAL EXAM]  General: adult in NAD,  WN,  WD.  HEENT: clear oropharynx, anicteric sclera, pink conjunctivae.  Neck: supple, no masses.  CV: normal S1S2, no murmur, no rubs, no gallops.  Lungs: clear to auscultation, no wheezes, no rales, no rhonchi.  Abdomen: soft, non-tender, non-distended, no hepatosplenomegaly, normal BS, no guarding.  Ext: no clubbing, no cyanosis, no edema.  Skin: no rashes,  no petechiae, no venous stasis changes.  Neuro: alert and oriented X3, no focal motor deficits.  LN: no SC NAPOLEON.      [LABS: ]                        12.2   6.07  )-----------( 179      ( 28 Mar 2021 06:22 )             36.0     CBC Full  -  ( 28 Mar 2021 06:22 )  WBC Count : 6.07 K/uL  RBC Count : 3.99 M/uL  Hemoglobin : 12.2 g/dL  Hematocrit : 36.0 %  Platelet Count - Automated : 179 K/uL  Mean Cell Volume : 90.2 fl  Mean Cell Hemoglobin : 30.6 pg  Mean Cell Hemoglobin Concentration : 33.9 gm/dL  Auto Neutrophil # : x  Auto Lymphocyte # : x  Auto Monocyte # : x  Auto Eosinophil # : x  Auto Basophil # : x  Auto Neutrophil % : x  Auto Lymphocyte % : x  Auto Monocyte % : x  Auto Eosinophil % : x  Auto Basophil % : x    03-28    141  |  109<H>  |  9   ----------------------------<  85  4.4   |  28  |  0.80    Ca    9.1      28 Mar 2021 06:22    TPro  6.4  /  Alb  3.0<L>  /  TBili  13.3<H>  /  DBili  10.80<H>  /  AST  1222<H>  /  ALT  1858<H>  /  AlkPhos  566<H>  03-28    PT/INR - ( 28 Mar 2021 06:22 )   PT: 12.2 sec;   INR: 1.04 ratio         PTT - ( 28 Mar 2021 06:22 )  PTT:45.3 sec  LIVER FUNCTIONS - ( 28 Mar 2021 06:22 )  Alb: 3.0 g/dL / Pro: 6.4 g/dL / ALK PHOS: 566 U/L / ALT: 1858 U/L / AST: 1222 U/L / GGT: x                   WBC  TREND (5 Days)  WBC Count: 6.07 K/uL (03-28 @ 06:22)  WBC Count: 5.42 K/uL (03-27 @ 06:37)  WBC Count: 6.13 K/uL (03-26 @ 06:30)    HGB  TREND (5 Days)  Hemoglobin: 12.2 g/dL (03-28 @ 06:22)  Hemoglobin: 11.8 g/dL (03-27 @ 06:37)  Hemoglobin: 12.2 g/dL (03-26 @ 06:30)    HCT  TREND (5 Days)  Hematocrit: 36.0 % (03-28 @ 06:22)  Hematocrit: 34.6 % (03-27 @ 06:37)  Hematocrit: 35.6 % (03-26 @ 06:30)    PLT  TREND (5 Days)  Platelet Count - Automated: 179 K/uL (03-28 @ 06:22)  Platelet Count - Automated: 154 K/uL (03-27 @ 06:37)  Platelet Count - Automated: 159 K/uL (03-26 @ 06:30)      Anemia Studies      Ferritin, Serum: 2436 ng/mL (03-28 @ 11:47)  Ferritin, Serum: 3118 ng/mL (03-25 @ 23:49)  Iron - Total Binding Capacity.: 427 ug/dL (03-25 @ 23:08)             [RADIOLOGY & ADDITIONAL STUDIES:]        Patient is a 75y old  Female who presents with a chief complaint of jaundice (28 Mar 2021 11:29)    HPI:  76 YO F PMHX hypothyroidism, HLD, nasal polyps, oA b/l knees, here for jaundice since Monday sent in by PCP for total bilirubin ~15. On Monday pt noticed bright yellow urine, sclera icterus. Pt then got worried and started drinking more water. Pt then took a shower and noticed her chest and stomach was yellow. Pt did not see a PCP until Wednesday because she was not in pain. On Wednesday pt saw Dr. Hammond, at her PCP's office, had bloodwork and UA done. Pt was supposed to to get an RUQ/abdominal US today, however she received a call form Dr. Hammond to immediately go to the ER for a total bilirubin level of ~15.  Of note, pt has been on rosuvastatin for about 6 years, has been taking tumeric 450 mg since December (for oA). Pt denies fatigue, travel, recent illness. Of note, pt's mother had "some type of liver problem". Pt denies fever, chills, CP, SOB, abdominal pain, edema.     Ed vitals 133/72, HR 98, RR 16, afebrile, 100% on RA.   Labs significant for total bilirubin 16.5, alk P 697, AST 2040, ALT 2898, Br 12.9, indirect bilirubin 3.6.   Pt received 1L NS bolus.   CT abd and pelvis gallbladder wall edema and periportal edema. Findings nonspecific, correlate clinically for acute viral hepatitis. Right hepatic cyst with multiple indeterminate hypodense hepatic lesions.     (25 Mar 2021 19:04)    hematology asked to see pt for high Ferritin.   Pt of Ashkenazi [employer]ism ancestry.   No family hx hemochromatosis.   Mother did have liver problems when she was ~69yo.   No family members with cirrhosis otherwise.   Had been taking tumeric for last several months  Had also been on Crestor with dose decreased from 10mg to 5mg in last 5mo    No recent infection  no fever, no chills, no malaise.     +COVID vaccine in  and Feb  no sx for COVID19    PAST MEDICAL & SURGICAL HISTORY:  Nasal polyps  Allergy  Hyperlipidemia    Hypothyroid  H/O removal of cyst, left 3rd digit  S/P cataract surgery  S/P tonsillectomy      HEALTH ISSUES - PROBLEM Dx:  Acute hepatitis  Abnormal iron saturation  Elevated LFTs  Nasal polyps  Hyperlipidemia  Hypothyroid  Viral hepatitis  Painless jaundice    Viral hepatitis without hepatic coma [B19.9]  Nasal polyps [J33.9]  Allergy [T78.40XA]  Hyperlipidemia [E78.5]  Hypothyroid [E03.9]  H/O removal of cyst [Z98.890]  S/P cataract surgery [Z98.49]  S/P tonsillectomy [Z90.89]  No significant past surgical history [[id number]]  Hypothyroid [E03.9]  Hyperlipidemia [E78.5]  Abnormal iron saturation [R79.0]  Jaundice [R17]      FAMILY HISTORY:  mother , hx of gallbladder surgery and liver problems ~ 69yo  father       [SOCIAL HISTORY: ]     smoking:  smoked x2yrs in college. 2-3cig per day.      EtOH:  1-2 glasses wine per week socially, last EtOH Nov, with pandemic     illicit drugs:  denies current     occupation:  retired     marital status:  ,   in last year     Other: vaccinated for COVID 19 with Pfizer vaccine 21 and 21      [ALLERGIES/INTOLERANCES:]  Allergies      aspirin (Rash)  Intolerances      [MEDICATIONS]  MEDICATIONS  (STANDING):  fluticasone propionate 50 MICROgram(s)/spray Nasal Spray 1 Spray(s) Both Nostrils <User Schedule>  heparin   Injectable 5000 Unit(s) SubCutaneous every 12 hours  levothyroxine 25 MICROGram(s) Oral daily  loratadine 10 milliGRAM(s) Oral daily  montelukast 10 milliGRAM(s) Oral daily  sodium chloride 0.9%. 1000 milliLiter(s) (50 mL/Hr) IV Continuous <Continuous>  ursodiol Capsule 300 milliGRAM(s) Oral every 12 hours    MEDICATIONS  (PRN):        [REVIEW OF SYSTEMS: ]  CONSTITUTIONAL: normal, no fever, no shakes, no chills   EYES: No eye pain, no visual disturbances, no discharge  ENMT:  no discharge  NECK: No pain, no stiffness  BREASTS: No pain, no masses, no nipple discharge  RESPIRATORY: No cough, no wheezing, no chills, no hemoptysis; No shortness of breath  CARDIOVASCULAR: No chest pain, no palpitations, no dizziness, no leg swelling  GASTROINTESTINAL: No abdominal, no epigastric pain. No nausea, no vomiting, no hematemesis; No diarrhea , no constipation. No melena, no hematochezia.  GENITOURINARY: No dysuria, no frequency, no hematuria, no incontinence  NEUROLOGICAL: No headaches, no memory loss, no loss of strength, no numbness, no tremors  SKIN: No itching, no burning, no rashes, no lesions   LYMPH NODES: No enlarged glands  ENDOCRINE: No heat or cold intolerance; No hair loss  MUSCULOSKELETAL: No joint pain or swelling; No muscle, no back, no extremity pain  PSYCHIATRIC: No depression, no anxiety, no mood swings, no difficulty sleeping  HEME/LYMPH: No easy bruising, no bleeding gums      [VITALS SIGNS 24hrs]  Vital Signs Last 24 Hrs  T(C): 36.7 (28 Mar 2021 12:47), Max: 36.7 (28 Mar 2021 12:47)  T(F): 98.1 (28 Mar 2021 12:47), Max: 98.1 (28 Mar 2021 12:47)  HR: 63 (28 Mar 2021 12:47) (63 - 71)  BP: 125/73 (28 Mar 2021 12:47) (105/60 - 125/73)  BP(mean): --  RR: 16 (28 Mar 2021 12:47) (16 - 17)  SpO2: 98% (28 Mar 2021 12:47) (95% - 98%)    [PHYSICAL EXAM]  General: adult in NAD,  WN,  WD. Thin  HEENT: clear oropharynx, +icteric sclera, pink conjunctivae. +jaundice  Neck: supple, no masses.  CV: normal S1S2, no murmur, no rubs, no gallops.  Lungs: clear to auscultation, no wheezes, no rales, no rhonchi.  Abdomen: soft, non-tender, non-distended, no hepatosplenomegaly, normal BS, no guarding.  Ext: no clubbing, no cyanosis, no edema.  Skin: no rashes,  no petechiae, no venous stasis changes.  Neuro: alert and oriented X3, no focal motor deficits.  LN: no SC NAPOLEON. no cervical NAPOLEON      [LABS: ]                        12.2   6.07  )-----------( 179      ( 28 Mar 2021 06:22 )             36.0     CBC Full  -  ( 28 Mar 2021 06:22 )  WBC Count : 6.07 K/uL  RBC Count : 3.99 M/uL  Hemoglobin : 12.2 g/dL  Hematocrit : 36.0 %  Platelet Count - Automated : 179 K/uL  Mean Cell Volume : 90.2 fl  Mean Cell Hemoglobin : 30.6 pg  Mean Cell Hemoglobin Concentration : 33.9 gm/dL  Auto Neutrophil # : x  Auto Lymphocyte # : x  Auto Monocyte # : x  Auto Eosinophil # : x  Auto Basophil # : x  Auto Neutrophil % : x  Auto Lymphocyte % : x  Auto Monocyte % : x  Auto Eosinophil % : x  Auto Basophil % : x        141  |  109<H>  |  9   ----------------------------<  85  4.4   |  28  |  0.80    Ca    9.1      28 Mar 2021 06:22    TPro  6.4  /  Alb  3.0<L>  /  TBili  13.3<H>  /  DBili  10.80<H>  /  AST  1222<H>  /  ALT  1858<H>  /  AlkPhos  566<H>      PT/INR - ( 28 Mar 2021 06:22 )   PT: 12.2 sec;   INR: 1.04 ratio         PTT - ( 28 Mar 2021 06:22 )  PTT:45.3 sec  LIVER FUNCTIONS - ( 28 Mar 2021 06:22 )  Alb: 3.0 g/dL / Pro: 6.4 g/dL / ALK PHOS: 566 U/L / ALT: 1858 U/L / AST: 1222 U/L / GGT: x                   WBC  TREND (5 Days)  WBC Count: 6.07 K/uL ( @ 06:22)  WBC Count: 5.42 K/uL ( @ 06:37)  WBC Count: 6.13 K/uL ( @ 06:30)    HGB  TREND (5 Days)  Hemoglobin: 12.2 g/dL ( @ 06:22)  Hemoglobin: 11.8 g/dL ( @ 06:37)  Hemoglobin: 12.2 g/dL ( @ 06:30)    HCT  TREND (5 Days)  Hematocrit: 36.0 % ( @ 06:22)  Hematocrit: 34.6 % ( @ 06:37)  Hematocrit: 35.6 % ( @ 06:30)    PLT  TREND (5 Days)  Platelet Count - Automated: 179 K/uL ( @ 06:22)  Platelet Count - Automated: 154 K/uL ( @ 06:37)  Platelet Count - Automated: 159 K/uL ( @ 06:30)      Anemia Studies      Ferritin, Serum: 2436 ng/mL ( @ 11:47)  Ferritin, Serum: 3118 ng/mL ( @ 23:49)  Iron with Total Binding Capacity (21 @ 23:08)   Iron - Total Binding Capacity.: 427 ug/dL   % Saturation, Iron: 53 %   Iron Total, Serum: 224 ug/dL   Unsaturated Iron Binding Capacity: 203 ug/dL       21  Ceruloplasmin 32  Alpha-1-antitrypsin 201    15  AFP 7.0  CEA 1.9  Anti-microsomal ab neg    HBV core IgM neg  HBV surface Ag neg  HBV PCR neg    HAV IgM neg    HCV PRC neg    VZV IgG 1508 Positve  Parvovirus IgG 5.30  Parvovirus IgM 0.24    HSV1 IgG 0.16 negative  HSV2 IgG 0.06 negative    anti-mitochondrial ab <1:20 neg  anti-smooth muscle ab <1:20 neg    EBV IgM neg  EBV Capsid antigen IgG positive  EBV early antigen positive  EBV nuclear antigen positive  EBV VCA IgM EIA <10  EBV VCA IgG   EBV EA Ab EIA >150  EBNA IgG Ab         21   TSH 4.55  HCV S/CO Ration 0.04 non-reactive  VZV IgM <0.91  [id number] Williams domain ab >250    21  CMV IgM ab <0.20 neg  CMV IgG ab <8.0 neg  HIV Combo neg  Rapid HIV neg        [RADIOLOGY & ADDITIONAL STUDIES:]     < from: MR Abdomen w/wo IV Cont (21 @ 14:28) >  EXAM:  MR ABDOMEN WAW IC                        PROCEDURE DATE:  2021    INTERPRETATION:  CLINICAL INFORMATION: Jaundice elevated LFTs  COMPARISON: None.  CONTRAST/COMPLICATIONS:  IV Contrast: Gadavist  5.5 cc administered   2 cc discarded  Oral Contrast: NONE  Complications: None reported at time of study completion  ·	MRCP and abdominal MR multisequence.  ·	No gallstones. Nonspecific pericholecystic and periportal edema may be reactive secondary to hepatocellular disease/infection, correlate for viral hepatitis. No biliary dilatation or common duct stone. Liver slightly enlarged demonstrates multiple cysts, otherwise normal signal behavior. Pancreas spleen adrenals and kidneys unremarkable.  ·	No ascites.  IMPRESSION:  ·	Nonspecific pericholecystic and periportal edema likely reactive.  ·	Mildly enlarged liver. Correlate for possible viral hepatitis  ·	No obstructive biliary pathology  VALENTÍN QUAN MD; Attending Radiologist  This document has been electronically signed. Mar 26 2021  2:57PM  < end of copied text >        < from: CT Abdomen and Pelvis w/ IV Cont (21 @ 15:42) >  EXAM:  CT ABDOMEN AND PELVIS IC                        PROCEDURE DATE:  2021    INTERPRETATION:  CLINICAL INFORMATION: Abdominal pain. Jaundice.  COMPARISON: None.  CONTRAST/COMPLICATIONS:  ·	IV Contrast: Omnipaque 350  90 cc administered   10 cc discarded  ·	Oral Contrast: NONE  ·	Complications: None reported at time of study completion  PROCEDURE:  ·	CT of the Abdomen and Pelvis was performed.  ·	Sagittal and coronal reformats were performed.  FINDINGS:  ·	LOWER CHEST: There is a 2.2 cm low-density lesion anterior right hepatic lobe with CT attenuation values compatible with hepatic cysts.  ·	There are multiple smaller, indeterminate hypodense hepatic lesions.  ·	There is periportal edema.  ·	LIVER: Within normal limits.  ·	BILE DUCTS: Normal caliber.  ·	GALLBLADDER: Gallbladder wall edema.  ·	SPLEEN: Within normal limits.  ·	PANCREAS: Within normal limits.  ·	ADRENALS: Within normal limits.  ·	KIDNEYS/URETERS: Within normal limits.  ·	BLADDER: Distended, correlate with urine output.  ·	REPRODUCTIVE ORGANS: Bulky coarse calcified uterine fibroids.  ·	BOWEL: Colonic distention with fecal retention.  ·	Moderately distended, fluid-filled stomach. There is fluid filled duodenum with bowel wall thickening involving the descending and transverse portion of the duodenum extending to the duodenojejunal junction. Recommend correlation for duodenitis.  ·	Appendix normal.  ·	PERITONEUM: No ascites.  ·	VESSELS: Atherosclerotic calcification, abdominal aorta is normal in caliber.  ·	RETROPERITONEUM/LYMPH NODES: No enlarged lymphadenopathy.  ·	ABDOMINAL WALL: Within normal limits.  ·	BONES: Degenerative changes spine.  IMPRESSION:  ·	Gallbladder wall edema and periportal edema.  ·	Findings nonspecific, correlate clinically for acute viral hepatitis.  ·	Right hepatic cyst with multiple indeterminate hypodense hepatic lesions.  ·	Recommend further after characterization with nonemergent MRI if there are no clinical contraindications.  ·	Other findings as discussed above.  LOLA CRUZ M.D., ATTENDING RADIOLOGIST  This document has been electronically signed. Mar 25 2021  4:15PM  < end of copied text >

## 2021-03-28 NOTE — PROGRESS NOTE ADULT - SUBJECTIVE AND OBJECTIVE BOX
INTERVAL HPI/OVERNIGHT EVENTS:  pt seen and examined  offers no gi complaints  labs/imaging noted      MEDICATIONS  (STANDING):  fluticasone propionate 50 MICROgram(s)/spray Nasal Spray 1 Spray(s) Both Nostrils <User Schedule>  heparin   Injectable 5000 Unit(s) SubCutaneous every 12 hours  levothyroxine 25 MICROGram(s) Oral daily  loratadine 10 milliGRAM(s) Oral daily  montelukast 10 milliGRAM(s) Oral daily  sodium chloride 0.9%. 1000 milliLiter(s) (50 mL/Hr) IV Continuous <Continuous>  ursodiol Capsule 300 milliGRAM(s) Oral every 12 hours    MEDICATIONS  (PRN):      Allergies    aspirin (Rash)    Intolerances      Review of Systems:    General:  No wt loss, fevers, chills, night sweats, fatigue   Eyes:  Good vision, no reported pain  ENT:  No sore throat, pain, runny nose, dysphagia  CV:  No pain, palpitations, hypo/hypertension  Resp:  No dyspnea, cough, tachypnea, wheezing  GI:  No pain, No nausea, No vomiting, No diarrhea, No constipation, No weight loss, No fever, No pruritis, No rectal bleeding, No melena, No dysphagia  :  No pain, bleeding, incontinence, nocturia  Muscle:  No pain, weakness  Neuro:  No weakness, tingling, memory problems  Psych:  No fatigue, insomnia, mood problems, depression  Endocrine:  No polyuria, polydypsia, cold/heat intolerance  Heme:  No petechiae, ecchymosis, easy bruisability  Skin:  jaundice       Vital Signs Last 24 Hrs  T(C): 36.6 (28 Mar 2021 05:24), Max: 36.6 (27 Mar 2021 13:12)  T(F): 97.9 (28 Mar 2021 05:24), Max: 97.9 (27 Mar 2021 13:12)  HR: 71 (28 Mar 2021 05:24) (69 - 76)  BP: 105/60 (28 Mar 2021 05:24) (105/60 - 114/69)  BP(mean): --  RR: 17 (28 Mar 2021 05:24) (17 - 18)  SpO2: 96% (28 Mar 2021 05:24) (95% - 98%)    03-27-21 @ 07:01  -  03-28-21 @ 07:00  --------------------------------------------------------  IN: 600 mL / OUT: 0 mL / NET: 600 mL          PHYSICAL EXAM:  General:  lying in bed in nad  HEENT:  NC/AT,  scleral icterus  Abdomen:  Soft, non-tender, non-distended  Extremities:  no edema  Skin:  diffuse jaundice  Neuro/Psych:  A&Ox3        LABS:                        12.2   6.07  )-----------( 179      ( 28 Mar 2021 06:22 )             36.0     03-28    141  |  109<H>  |  9   ----------------------------<  85  4.4   |  28  |  0.80    Ca    9.1      28 Mar 2021 06:22    TPro  6.4  /  Alb  3.0<L>  /  TBili  13.3<H>  /  DBili  10.80<H>  /  AST  1222<H>  /  ALT  1858<H>  /  AlkPhos  566<H>  03-28    PT/INR - ( 28 Mar 2021 06:22 )   PT: 12.2 sec;   INR: 1.04 ratio         PTT - ( 28 Mar 2021 06:22 )  PTT:45.3 sec          RADIOLOGY & ADDITIONAL TESTS:  < from: CT Abdomen and Pelvis w/ IV Cont (03.25.21 @ 15:42) >    EXAM:  CT ABDOMEN AND PELVIS IC                            PROCEDURE DATE:  03/25/2021          INTERPRETATION:  CLINICAL INFORMATION: Abdominal pain. Jaundice.    COMPARISON: None.    CONTRAST/COMPLICATIONS:  IV Contrast: Omnipaque 350  90 cc administered   10 cc discarded  Oral Contrast: NONE  Complications: None reported at time of study completion    PROCEDURE:  CT of the Abdomen and Pelvis was performed.  Sagittal and coronal reformats were performed.    FINDINGS:    LOWER CHEST: There is a 2.2 cm low-density lesion anterior right hepatic lobe with CT attenuation values compatible with hepatic cysts.  There are multiple smaller, indeterminate hypodense hepatic lesions.  There is periportal edema.    LIVER: Within normal limits.  BILE DUCTS: Normal caliber.  GALLBLADDER: Gallbladder wall edema.  SPLEEN: Within normal limits.  PANCREAS: Within normal limits.  ADRENALS: Within normal limits.  KIDNEYS/URETERS: Within normal limits.    BLADDER: Distended, correlate with urine output.  REPRODUCTIVE ORGANS: Bulky coarse calcified uterine fibroids.    BOWEL: Colonic distention with fecal retention.  Moderately distended, fluid-filled stomach. There is fluid filled duodenum with bowel wall thickening involving the descending and transverse portion of the duodenum extending to the duodenojejunal junction. Recommend correlation for duodenitis.  Appendix normal.  PERITONEUM: No ascites.    VESSELS: Atherosclerotic calcification, abdominal aorta is normal in caliber.  RETROPERITONEUM/LYMPH NODES: No enlarged lymphadenopathy.  ABDOMINAL WALL: Within normal limits.    BONES: Degenerative changes spine.    IMPRESSION:    Gallbladder wall edema and periportal edema.  Findings nonspecific, correlate clinically for acute viral hepatitis.    Right hepatic cyst with multiple indeterminate hypodense hepatic lesions.  Recommend further after characterization with nonemergent MRI if there are no clinical contraindications.    Other findings as discussed above.              LOLA CRUZ M.D., ATTENDING RADIOLOGIST  This document has been electronically signed. Mar 25 2021  4:15PM

## 2021-03-28 NOTE — CONSULT NOTE ADULT - ASSESSMENT
[ASSESSMENT and  PLAN]  Markedly elevated ferritin in pt with no clear cause.   Pattern of liver abn c/w acute hepatitis.   Marked increased liver enzymes AST 2040, ALT 2898, with relative lower ALkPhos 697.  Cause of hepatitis unknown.     Usual viral causes negative.   Medication/supplement/toxic induced hepatic injury possible.   APAP and ASA levels low.     COVID PCR negative.   s/p COVID 19 Pfizer vaccination with elevated spike protein titers still >250    Ferritin downtrending with LFTs  Suggest against hemochromatosis.   Likely ferritin elevated due to acute phase reactant.   INR 1.04, normal.   CBC normal. Non-toxic appearing.     RECOMMENDATIONS  Mgmt of hepatitis per GI    Infectious etiology being sought.   Infectious diseases consulting    DVT Prophylaxis  OOB as tolerated.   SQ heparin    Recommend outpt follow up with GI to ensure resolution of abn LFTs  Recommend outpt followup with heme in 2mo post resolution of acute illness to re-eval ferritin.   Discussed with pt    I have discussed the above plan of care with patient in detail. They expressed understanding of the treatment plan . Risks, benefits and alternatives discussed in detail. I have asked if they have any questions or concerns and appropriately addressed them; all questions answered to their satisfactions and in lay terms.     Thank you for consulting us.   No additional recommendations at current time.   Will sign off on case for now.   Please call, or re-consult if needed.

## 2021-03-28 NOTE — PROGRESS NOTE ADULT - PROBLEM SELECTOR PLAN 2
-elevated ferritin 3118, likely elevated as an acute phase reactant however possible hemochromatosis  -iron elevated 224, %sat elevated 53  -hematology consulted, f/u recommendations

## 2021-03-28 NOTE — PROGRESS NOTE ADULT - ATTENDING COMMENTS
admitted for work up of painless jaundice/transaminitis  - continue IV hydration  - dc turmeric, hold statin  - Improving Bili, monitor liver enzymes  ursodiol started per Gi  no indication for lover biopsy at this time per GI  - high ferritin, likely acute phase, but in setting of high % iron sat, will follow. Lower suspicion for hemochromatosis but will c/s hematology for input.

## 2021-03-28 NOTE — PROGRESS NOTE ADULT - SUBJECTIVE AND OBJECTIVE BOX
Patient is a 75y old  Female who presents with a chief complaint of jaundice (28 Mar 2021 11:12)      INTERVAL HPI/OVERNIGHT EVENTS: Patient seen and examined at bedside. No overnight events occurred. Patient endorses abdominal discomfort and bloating since starting Miralax, reporting significant sensitivity to medications that she does not routinely take. Denies fevers, chills, headache, lightheadedness, chest pain, dyspnea, n/v/d/c.    MEDICATIONS  (STANDING):  fluticasone propionate 50 MICROgram(s)/spray Nasal Spray 1 Spray(s) Both Nostrils <User Schedule>  heparin   Injectable 5000 Unit(s) SubCutaneous every 12 hours  levothyroxine 25 MICROGram(s) Oral daily  loratadine 10 milliGRAM(s) Oral daily  montelukast 10 milliGRAM(s) Oral daily  sodium chloride 0.9%. 1000 milliLiter(s) (50 mL/Hr) IV Continuous <Continuous>  ursodiol Capsule 300 milliGRAM(s) Oral every 12 hours    MEDICATIONS  (PRN):      Allergies    aspirin (Rash)    Intolerances        REVIEW OF SYSTEMS:  CONSTITUTIONAL: No fever or chills  HEENT:  No headache, no sore throat  RESPIRATORY: No cough, wheezing, or shortness of breath  CARDIOVASCULAR: No chest pain, palpitations  GASTROINTESTINAL: admits abd discomfort, no nausea, vomiting, or diarrhea  GENITOURINARY: No dysuria, frequency, or hematuria  NEUROLOGICAL: no focal weakness or dizziness  MUSCULOSKELETAL: no myalgias     Vital Signs Last 24 Hrs  T(C): 36.6 (28 Mar 2021 05:24), Max: 36.6 (27 Mar 2021 13:12)  T(F): 97.9 (28 Mar 2021 05:24), Max: 97.9 (27 Mar 2021 13:12)  HR: 71 (28 Mar 2021 05:24) (69 - 76)  BP: 105/60 (28 Mar 2021 05:24) (105/60 - 114/69)  BP(mean): --  RR: 17 (28 Mar 2021 05:24) (17 - 18)  SpO2: 96% (28 Mar 2021 05:24) (95% - 98%)    PHYSICAL EXAM:  GENERAL: NAD  HEENT:  bilateral scleral icterus, jaundiced frenulum, moist mucous membranes  CHEST/LUNG:  CTA b/l, no rales, wheezes, or rhonchi  HEART:  RRR, S1, S2  ABDOMEN:  BS+, soft, nontender, mildly distended  EXTREMITIES: no edema, cyanosis, or calf tenderness  NERVOUS SYSTEM: answers questions and follows commands appropriately    LABS:                        12.2   6.07  )-----------( 179      ( 28 Mar 2021 06:22 )             36.0     CBC Full  -  ( 28 Mar 2021 06:22 )  WBC Count : 6.07 K/uL  Hemoglobin : 12.2 g/dL  Hematocrit : 36.0 %  Platelet Count - Automated : 179 K/uL  Mean Cell Volume : 90.2 fl  Mean Cell Hemoglobin : 30.6 pg  Mean Cell Hemoglobin Concentration : 33.9 gm/dL  Auto Neutrophil # : x  Auto Lymphocyte # : x  Auto Monocyte # : x  Auto Eosinophil # : x  Auto Basophil # : x  Auto Neutrophil % : x  Auto Lymphocyte % : x  Auto Monocyte % : x  Auto Eosinophil % : x  Auto Basophil % : x    28 Mar 2021 06:22    141    |  109    |  9      ----------------------------<  85     4.4     |  28     |  0.80     Ca    9.1        28 Mar 2021 06:22    TPro  6.4    /  Alb  3.0    /  TBili  13.3   /  DBili  10.80  /  AST  1222   /  ALT  1858   /  AlkPhos  566    28 Mar 2021 06:22    PT/INR - ( 28 Mar 2021 06:22 )   PT: 12.2 sec;   INR: 1.04 ratio         PTT - ( 28 Mar 2021 06:22 )  PTT:45.3 sec  Urinalysis Basic - ( 26 Mar 2021 12:07 )    Color: Yellow / Appearance: Clear / S.005 / pH: x  Gluc: x / Ketone: Small  / Bili: Small / Urobili: 1   Blood: x / Protein: Negative / Nitrite: Negative   Leuk Esterase: Small / RBC: 0-2 /HPF / WBC 6-10   Sq Epi: x / Non Sq Epi: Occasional / Bacteria: Few        RADIOLOGY & ADDITIONAL TESTS: No new imaging.    Personally reviewed.     Consultant(s) Notes Reviewed:  [x] YES  [ ] NO     Patient is a 75y old  Female who presents with a chief complaint of jaundice (28 Mar 2021 11:12)      INTERVAL HPI/OVERNIGHT EVENTS: Patient seen and examined at bedside. No overnight events occurred. Patient endorses abdominal discomfort and bloating since starting Miralax, reporting significant sensitivity to medications that she does not routinely take. Denies fevers, chills, headache, lightheadedness, chest pain, dyspnea, n/v/d/c.    MEDICATIONS  (STANDING):  fluticasone propionate 50 MICROgram(s)/spray Nasal Spray 1 Spray(s) Both Nostrils <User Schedule>  heparin   Injectable 5000 Unit(s) SubCutaneous every 12 hours  levothyroxine 25 MICROGram(s) Oral daily  loratadine 10 milliGRAM(s) Oral daily  montelukast 10 milliGRAM(s) Oral daily  sodium chloride 0.9%. 1000 milliLiter(s) (50 mL/Hr) IV Continuous <Continuous>  ursodiol Capsule 300 milliGRAM(s) Oral every 12 hours    MEDICATIONS  (PRN):      Allergies    aspirin (Rash)    Intolerances    REVIEW OF SYSTEMS:  CONSTITUTIONAL: No fever or chills  HEENT:  No headache, no sore throat  RESPIRATORY: No cough, wheezing, or shortness of breath  CARDIOVASCULAR: No chest pain, palpitations  GASTROINTESTINAL: admits abd discomfort, no nausea, vomiting, or diarrhea  GENITOURINARY: No dysuria, frequency, or hematuria  NEUROLOGICAL: no focal weakness or dizziness  MUSCULOSKELETAL: no myalgias     Vital Signs Last 24 Hrs  T(C): 36.6 (28 Mar 2021 05:24), Max: 36.6 (27 Mar 2021 13:12)  T(F): 97.9 (28 Mar 2021 05:24), Max: 97.9 (27 Mar 2021 13:12)  HR: 71 (28 Mar 2021 05:24) (69 - 76)  BP: 105/60 (28 Mar 2021 05:24) (105/60 - 114/69)  BP(mean): --  RR: 17 (28 Mar 2021 05:24) (17 - 18)  SpO2: 96% (28 Mar 2021 05:24) (95% - 98%)    PHYSICAL EXAM:  GENERAL: NAD  HEENT:  bilateral scleral icterus, jaundiced frenulum, moist mucous membranes  CHEST/LUNG:  CTA b/l, no rales, wheezes, or rhonchi  HEART:  RRR, S1, S2  ABDOMEN:  BS+, soft, nontender, mildly distended  EXTREMITIES: no edema, cyanosis, or calf tenderness  SKIN: generalized yellowing of skin  NERVOUS SYSTEM: answers questions and follows commands appropriately    LABS:                        12.2   6.07  )-----------( 179      ( 28 Mar 2021 06:22 )             36.0     CBC Full  -  ( 28 Mar 2021 06:22 )  WBC Count : 6.07 K/uL  Hemoglobin : 12.2 g/dL  Hematocrit : 36.0 %  Platelet Count - Automated : 179 K/uL  Mean Cell Volume : 90.2 fl  Mean Cell Hemoglobin : 30.6 pg  Mean Cell Hemoglobin Concentration : 33.9 gm/dL  Auto Neutrophil # : x  Auto Lymphocyte # : x  Auto Monocyte # : x  Auto Eosinophil # : x  Auto Basophil # : x  Auto Neutrophil % : x  Auto Lymphocyte % : x  Auto Monocyte % : x  Auto Eosinophil % : x  Auto Basophil % : x    28 Mar 2021 06:22    141    |  109    |  9      ----------------------------<  85     4.4     |  28     |  0.80     Ca    9.1        28 Mar 2021 06:22    TPro  6.4    /  Alb  3.0    /  TBili  13.3   /  DBili  10.80  /  AST  1222   /  ALT  1858   /  AlkPhos  566    28 Mar 2021 06:22    PT/INR - ( 28 Mar 2021 06:22 )   PT: 12.2 sec;   INR: 1.04 ratio         PTT - ( 28 Mar 2021 06:22 )  PTT:45.3 sec  Urinalysis Basic - ( 26 Mar 2021 12:07 )    Color: Yellow / Appearance: Clear / S.005 / pH: x  Gluc: x / Ketone: Small  / Bili: Small / Urobili: 1   Blood: x / Protein: Negative / Nitrite: Negative   Leuk Esterase: Small / RBC: 0-2 /HPF / WBC 6-10   Sq Epi: x / Non Sq Epi: Occasional / Bacteria: Few        RADIOLOGY & ADDITIONAL TESTS: No new imaging.    Personally reviewed.     Consultant(s) Notes Reviewed:  [x] YES  [ ] NO

## 2021-03-28 NOTE — PROGRESS NOTE ADULT - PROBLEM SELECTOR PLAN 1
no -painless jaundice, yellow urine, scleral icterus since Monday, +Courvoisier's sign, 2/2 hepatitis possibly drug induced vs viral vs ?neoplastic vs ?autoimmune  -CT abd and pelvis gallbladder wall edema and periportal edema. Right hepatic cyst with multiple indeterminate hypodense hepatic lesions.  -MR abdomen w/ nonspecific pericholecystic and periportal edema likely reactive. Mildly enlarged liver. No obstructive biliary pathology.  -LFTs with mild improvement however remains elevated  - Ursodiol started per GI  -viral hep panel negative so far, EBV panels positive however rep old infection, autoimmune panel negative so far  -CMV, varicella, and herpes negative; lepto also ordered per ID; HIV negative  -Pt received 1L NS bolus in ED, will continue gentle hydration @50 cc/hr  -has been taking tumeric, per ID states this could actually be the cause of hepatitis and liver damage in certain patients and may not have any symptoms.  -question whether liver biopsy is warranted  -trend hepatic panel, liver enzymes, bilirubin, PT/INR  -hold home rosuvastatin, hold hepatotoxins   -tolerating regular diet  -Ca-19 negative  -ceruloplasmin negative, Alpha 1 antitrypsin borderline high, likely insignificant in this scenario  -GI (Dr. Rowley) following, recs appreciated  -ID (Dr. Delgado) following, recs appreciated

## 2021-03-29 LAB
ALBUMIN SERPL ELPH-MCNC: 2.9 G/DL — LOW (ref 3.3–5)
ALP SERPL-CCNC: 529 U/L — HIGH (ref 40–120)
ALT FLD-CCNC: 1636 U/L — HIGH (ref 12–78)
ANION GAP SERPL CALC-SCNC: 4 MMOL/L — LOW (ref 5–17)
AST SERPL-CCNC: 987 U/L — HIGH (ref 15–37)
BILIRUB SERPL-MCNC: 10.8 MG/DL — HIGH (ref 0.2–1.2)
BUN SERPL-MCNC: 9 MG/DL — SIGNIFICANT CHANGE UP (ref 7–23)
CALCIUM SERPL-MCNC: 9.4 MG/DL — SIGNIFICANT CHANGE UP (ref 8.5–10.1)
CHLORIDE SERPL-SCNC: 107 MMOL/L — SIGNIFICANT CHANGE UP (ref 96–108)
CO2 SERPL-SCNC: 30 MMOL/L — SIGNIFICANT CHANGE UP (ref 22–31)
CREAT SERPL-MCNC: 0.69 MG/DL — SIGNIFICANT CHANGE UP (ref 0.5–1.3)
GLUCOSE SERPL-MCNC: 85 MG/DL — SIGNIFICANT CHANGE UP (ref 70–99)
HCT VFR BLD CALC: 34.2 % — LOW (ref 34.5–45)
HGB BLD-MCNC: 11.5 G/DL — SIGNIFICANT CHANGE UP (ref 11.5–15.5)
HSV1 AB FLD QL: SIGNIFICANT CHANGE UP TITER
HSV2 AB FLD-ACNC: SIGNIFICANT CHANGE UP TITER
LEPTOSPIRA AB TITR SER: NEGATIVE — SIGNIFICANT CHANGE UP
MCHC RBC-ENTMCNC: 30.6 PG — SIGNIFICANT CHANGE UP (ref 27–34)
MCHC RBC-ENTMCNC: 33.6 GM/DL — SIGNIFICANT CHANGE UP (ref 32–36)
MCV RBC AUTO: 91 FL — SIGNIFICANT CHANGE UP (ref 80–100)
NRBC # BLD: 0 /100 WBCS — SIGNIFICANT CHANGE UP (ref 0–0)
PLATELET # BLD AUTO: 156 K/UL — SIGNIFICANT CHANGE UP (ref 150–400)
POTASSIUM SERPL-MCNC: 4.7 MMOL/L — SIGNIFICANT CHANGE UP (ref 3.5–5.3)
POTASSIUM SERPL-SCNC: 4.7 MMOL/L — SIGNIFICANT CHANGE UP (ref 3.5–5.3)
PROT SERPL-MCNC: 6 G/DL — SIGNIFICANT CHANGE UP (ref 6–8.3)
RBC # BLD: 3.76 M/UL — LOW (ref 3.8–5.2)
RBC # FLD: 18.3 % — HIGH (ref 10.3–14.5)
SODIUM SERPL-SCNC: 141 MMOL/L — SIGNIFICANT CHANGE UP (ref 135–145)
WBC # BLD: 6.26 K/UL — SIGNIFICANT CHANGE UP (ref 3.8–10.5)
WBC # FLD AUTO: 6.26 K/UL — SIGNIFICANT CHANGE UP (ref 3.8–10.5)

## 2021-03-29 PROCEDURE — 99232 SBSQ HOSP IP/OBS MODERATE 35: CPT | Mod: GC

## 2021-03-29 RX ORDER — LANOLIN ALCOHOL/MO/W.PET/CERES
3 CREAM (GRAM) TOPICAL AT BEDTIME
Refills: 0 | Status: DISCONTINUED | OUTPATIENT
Start: 2021-03-29 | End: 2021-03-30

## 2021-03-29 RX ORDER — SODIUM CHLORIDE 9 MG/ML
1000 INJECTION, SOLUTION INTRAVENOUS
Refills: 0 | Status: DISCONTINUED | OUTPATIENT
Start: 2021-03-29 | End: 2021-03-30

## 2021-03-29 RX ADMIN — Medication 3 MILLIGRAM(S): at 00:12

## 2021-03-29 RX ADMIN — Medication 1 SPRAY(S): at 18:53

## 2021-03-29 RX ADMIN — URSODIOL 300 MILLIGRAM(S): 250 TABLET, FILM COATED ORAL at 18:53

## 2021-03-29 RX ADMIN — SODIUM CHLORIDE 60 MILLILITER(S): 9 INJECTION, SOLUTION INTRAVENOUS at 08:34

## 2021-03-29 RX ADMIN — Medication 25 MICROGRAM(S): at 05:13

## 2021-03-29 RX ADMIN — HEPARIN SODIUM 5000 UNIT(S): 5000 INJECTION INTRAVENOUS; SUBCUTANEOUS at 05:13

## 2021-03-29 RX ADMIN — HEPARIN SODIUM 5000 UNIT(S): 5000 INJECTION INTRAVENOUS; SUBCUTANEOUS at 18:53

## 2021-03-29 NOTE — PROGRESS NOTE ADULT - PROBLEM SELECTOR PLAN 1
ctap and mri reviewed  liver engorged, no duct blockage, no hepatobiliary mass noted  lfts improving; suspecting viral hepatitis as etiology  continue to hold home statin  cont ursodiol as tolerated  trend labs, avoid hepatotoxins  plan dw attg and agreeable, will follow ctap and mri reviewed  liver engorged, no duct blockage, no hepatobiliary mass noted  lfts improving; suspecting viral hepatitis as etiology  continue to hold home statin  cont ursodiol as tolerated  trend labs, avoid hepatotoxins  plan dw pt and attg in detail

## 2021-03-29 NOTE — PROGRESS NOTE ADULT - PROBLEM SELECTOR PLAN 2
Elevated ferritin 3118, likely elevated as an acute phase reactant however possible hemochromatosis  - Iron elevated 224, %sat elevated 53  - Hematology consulted believe elevated ferritin is acute phase reactant and doubt hemochromatosis   - Can follow up with heme in 2 months to recheck ferritin

## 2021-03-29 NOTE — PROGRESS NOTE ADULT - SUBJECTIVE AND OBJECTIVE BOX
Patient is a 75y old  Female who presents with a chief complaint of jaundice (28 Mar 2021 15:12)      INTERVAL HPI/OVERNIGHT EVENTS: Patient seen and examined at bedside. No overnight events occurred. Patient states she had successful bowel movement last night after having abdominal bloating and pain. She admits abdominal "soreness" which she attributes to having BM or due to Ursodiol. Patient states her scleral icterus is improving. Denies fevers, chills, headache, lightheadedness, chest pain, dyspnea, abdominal pain, n/v/d/c.    MEDICATIONS  (STANDING):  fluticasone propionate 50 MICROgram(s)/spray Nasal Spray 1 Spray(s) Both Nostrils <User Schedule>  heparin   Injectable 5000 Unit(s) SubCutaneous every 12 hours  lactated ringers. 1000 milliLiter(s) (60 mL/Hr) IV Continuous <Continuous>  levothyroxine 25 MICROGram(s) Oral daily  loratadine 10 milliGRAM(s) Oral daily  montelukast 10 milliGRAM(s) Oral daily  ursodiol Capsule 300 milliGRAM(s) Oral every 12 hours    MEDICATIONS  (PRN):  melatonin 3 milliGRAM(s) Oral at bedtime PRN Insomnia      Allergies    aspirin (Rash)    Intolerances        REVIEW OF SYSTEMS:  CONSTITUTIONAL: No fever or chills  HEENT:  No headache, no sore throat  RESPIRATORY: No cough, wheezing, or shortness of breath  CARDIOVASCULAR: No chest pain, palpitations  GASTROINTESTINAL: Admits abdominal soreness. No nausea, vomiting, or diarrhea  GENITOURINARY: No dysuria, frequency, or hematuria  NEUROLOGICAL: no focal weakness or dizziness  MUSCULOSKELETAL: no myalgias     Vital Signs Last 24 Hrs  T(C): 36.6 (29 Mar 2021 04:39), Max: 36.7 (28 Mar 2021 12:47)  T(F): 97.8 (29 Mar 2021 04:39), Max: 98.1 (28 Mar 2021 12:47)  HR: 76 (29 Mar 2021 04:39) (63 - 76)  BP: 101/61 (29 Mar 2021 04:39) (101/61 - 125/73)  BP(mean): --  RR: 17 (29 Mar 2021 04:39) (16 - 17)  SpO2: 97% (29 Mar 2021 04:39) (97% - 98%)    PHYSICAL EXAM:  GENERAL: NAD  HEENT:  bilateral scleral icterus, jaundiced frenulum, moist mucous membranes  CHEST/LUNG:  CTA b/l, no rales, wheezes, or rhonchi  HEART:  RRR, S1, S2  ABDOMEN:  BS+, soft, nontender, mildly distended  EXTREMITIES: no edema, cyanosis, or calf tenderness  SKIN: generalized yellowing of skin  NERVOUS SYSTEM: answers questions and follows commands appropriately    LABS:                        11.5   6.26  )-----------( 156      ( 29 Mar 2021 07:03 )             34.2     CBC Full  -  ( 29 Mar 2021 07:03 )  WBC Count : 6.26 K/uL  Hemoglobin : 11.5 g/dL  Hematocrit : 34.2 %  Platelet Count - Automated : 156 K/uL  Mean Cell Volume : 91.0 fl  Mean Cell Hemoglobin : 30.6 pg  Mean Cell Hemoglobin Concentration : 33.6 gm/dL  Auto Neutrophil # : x  Auto Lymphocyte # : x  Auto Monocyte # : x  Auto Eosinophil # : x  Auto Basophil # : x  Auto Neutrophil % : x  Auto Lymphocyte % : x  Auto Monocyte % : x  Auto Eosinophil % : x  Auto Basophil % : x    29 Mar 2021 07:04    141    |  107    |  9      ----------------------------<  85     4.7     |  30     |  0.69     Ca    9.4        29 Mar 2021 07:04    TPro  6.0    /  Alb  2.9    /  TBili  10.8   /  DBili  x      /  AST  987    /  ALT  1636   /  AlkPhos  529    29 Mar 2021 07:04    PT/INR - ( 28 Mar 2021 06:22 )   PT: 12.2 sec;   INR: 1.04 ratio         PTT - ( 28 Mar 2021 06:22 )  PTT:45.3 sec    CAPILLARY BLOOD GLUCOSE              RADIOLOGY & ADDITIONAL TESTS:    Personally reviewed.     Consultant(s) Notes Reviewed:  [x] YES  [ ] NO

## 2021-03-29 NOTE — PROGRESS NOTE ADULT - TIME BILLING
Chart review, examination, documentation, care coordination and counseling.

## 2021-03-29 NOTE — PROGRESS NOTE ADULT - SUBJECTIVE AND OBJECTIVE BOX
INTERVAL HPI/OVERNIGHT EVENTS:  pt seen and examined  denies n/v  c/o some abd bloating, had bm    MEDICATIONS  (STANDING):  fluticasone propionate 50 MICROgram(s)/spray Nasal Spray 1 Spray(s) Both Nostrils <User Schedule>  heparin   Injectable 5000 Unit(s) SubCutaneous every 12 hours  lactated ringers. 1000 milliLiter(s) (60 mL/Hr) IV Continuous <Continuous>  levothyroxine 25 MICROGram(s) Oral daily  loratadine 10 milliGRAM(s) Oral daily  montelukast 10 milliGRAM(s) Oral daily  ursodiol Capsule 300 milliGRAM(s) Oral every 12 hours    MEDICATIONS  (PRN):  melatonin 3 milliGRAM(s) Oral at bedtime PRN Insomnia      Allergies    aspirin (Rash)    Intolerances        Review of Systems:    General:  No wt loss, fevers, chills, night sweats, fatigue   Eyes:  Good vision, no reported pain  ENT:  No sore throat, pain, runny nose, dysphagia  CV:  No pain, palpitations, hypo/hypertension  Resp:  No dyspnea, cough, tachypnea, wheezing  GI: see above   :  No pain, bleeding, incontinence, nocturia  Muscle:  No pain, weakness  Neuro:  No weakness, tingling, memory problems  Psych:  No fatigue, insomnia, mood problems, depression  Endocrine:  No polyuria, polydypsia, cold/heat intolerance  Heme:  No petechiae, ecchymosis, easy bruisability  Skin:  jaundice       Vital Signs Last 24 Hrs  T(C): 36.6 (29 Mar 2021 13:02), Max: 36.7 (28 Mar 2021 21:02)  T(F): 97.8 (29 Mar 2021 13:02), Max: 98.1 (28 Mar 2021 21:02)  HR: 71 (29 Mar 2021 13:02) (70 - 76)  BP: 107/71 (29 Mar 2021 13:02) (101/61 - 108/66)  BP(mean): --  RR: 16 (29 Mar 2021 13:02) (16 - 17)  SpO2: 98% (29 Mar 2021 13:02) (97% - 98%)    PHYSICAL EXAM:    Constitutional: oob in chair in nad  HEENT: ncat  Gastrointestinal:  soft minimal mid abd ttp no guarding non dt  Extremities: No peripheral edema  Vascular: + peripheral pulses  Neurological: A/O x 3  Skin: jaundice      LABS:                        11.5   6.26  )-----------( 156      ( 29 Mar 2021 07:03 )             34.2     03-29    141  |  107  |  9   ----------------------------<  85  4.7   |  30  |  0.69    Ca    9.4      29 Mar 2021 07:04    TPro  6.0  /  Alb  2.9<L>  /  TBili  10.8<H>  /  DBili  x   /  AST  987<H>  /  ALT  1636<H>  /  AlkPhos  529<H>  03-29    PT/INR - ( 28 Mar 2021 06:22 )   PT: 12.2 sec;   INR: 1.04 ratio         PTT - ( 28 Mar 2021 06:22 )  PTT:45.3 sec      RADIOLOGY & ADDITIONAL TESTS:

## 2021-03-29 NOTE — PROGRESS NOTE ADULT - PROBLEM SELECTOR PLAN 3
Chronic  - Continue home levothyroxine 25 mg qD   - TSH repeat in range 3.8, likely elevated on admission in setting of acute illness  - Continue home dose meds

## 2021-03-29 NOTE — PROGRESS NOTE ADULT - PROBLEM SELECTOR PLAN 1
Patient presenting painless jaundice, yellow urine, scleral icterus, +Courvoisier's sign  - Etiology remains unclear at this time possibly drug induced vs viral vs ?neoplastic vs ?autoimmune  - CT abd and pelvis gallbladder wall edema and periportal edema. Right hepatic cyst with multiple indeterminate hypodense hepatic lesions.  - MR abdomen w/ nonspecific pericholecystic and periportal edema likely reactive. Mildly enlarged liver. No obstructive biliary pathology.  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  - Question whether liver biopsy is warranted  - Trend hepatic panel, liver enzymes, bilirubin, PT/INR  - Hold home rosuvastatin, hold hepatotoxins   -GI (Dr. Diaz) following, recs appreciated  -ID (Dr. Delgado) following, recs appreciated

## 2021-03-29 NOTE — PROGRESS NOTE ADULT - PROBLEM SELECTOR PLAN 6
VTE PPx SQH    IMPROVE VTE Individual Risk Assessment        RISK                                                          Points  [  ] Previous VTE                                                3  [  ] Thrombophilia                                             2  [  ] Lower limb paralysis                                   2        (unable to hold up >15 seconds)    [  ] Current Cancer                                            2         (within 6 months)  [  ] Immobilization > 24 hrs                              1  [  ] ICU/CCU stay > 24 hours                            1  [  ] Age > 60                                                    1  IMPROVE VTE Score ____1_____
vte ppx heparin     IMPROVE VTE Individual Risk Assessment        RISK                                                          Points  [  ] Previous VTE                                                3  [  ] Thrombophilia                                             2  [  ] Lower limb paralysis                                   2        (unable to hold up >15 seconds)    [  ] Current Cancer                                            2         (within 6 months)  [  ] Immobilization > 24 hrs                              1  [  ] ICU/CCU stay > 24 hours                            1  [  ] Age > 60                                                    1  IMPROVE VTE Score ____1_____

## 2021-03-29 NOTE — PROGRESS NOTE ADULT - ATTENDING COMMENTS
admitted for work up of painless jaundice/transaminitis  - covid vaccinated  - continue IV hydration  - dc turmeric, hold statin  - Improving Bili, monitor liver enzymes  - cont ursodiol   - no indication for lover biopsy at this time per GI, outpatient follow up  - high ferritin and high iron saturation, likely acute phase, low chance of hemochromatosis per hem/onc, ferritin downtrending.

## 2021-03-29 NOTE — PROGRESS NOTE ADULT - PROBLEM SELECTOR PLAN 5
History of nasal polyps, allergies  - Takes montelukast 10 mg qD at home, start Claritin qD as interchange   - Continue home fluticasone 1 spray each nostril qD

## 2021-03-30 ENCOUNTER — TRANSCRIPTION ENCOUNTER (OUTPATIENT)
Age: 76
End: 2021-03-30

## 2021-03-30 VITALS
DIASTOLIC BLOOD PRESSURE: 72 MMHG | SYSTOLIC BLOOD PRESSURE: 111 MMHG | TEMPERATURE: 98 F | OXYGEN SATURATION: 93 % | RESPIRATION RATE: 18 BRPM | HEART RATE: 77 BPM

## 2021-03-30 LAB
ALBUMIN SERPL ELPH-MCNC: 2.9 G/DL — LOW (ref 3.3–5)
ALP SERPL-CCNC: 507 U/L — HIGH (ref 40–120)
ALT FLD-CCNC: 1430 U/L — HIGH (ref 12–78)
ANION GAP SERPL CALC-SCNC: 4 MMOL/L — LOW (ref 5–17)
APTT BLD: 55.4 SEC — HIGH (ref 27.5–35.5)
AST SERPL-CCNC: 767 U/L — HIGH (ref 15–37)
BILIRUB SERPL-MCNC: 8 MG/DL — HIGH (ref 0.2–1.2)
BUN SERPL-MCNC: 11 MG/DL — SIGNIFICANT CHANGE UP (ref 7–23)
CALCIUM SERPL-MCNC: 9.1 MG/DL — SIGNIFICANT CHANGE UP (ref 8.5–10.1)
CHLORIDE SERPL-SCNC: 108 MMOL/L — SIGNIFICANT CHANGE UP (ref 96–108)
CO2 SERPL-SCNC: 29 MMOL/L — SIGNIFICANT CHANGE UP (ref 22–31)
CREAT SERPL-MCNC: 0.68 MG/DL — SIGNIFICANT CHANGE UP (ref 0.5–1.3)
GLUCOSE SERPL-MCNC: 81 MG/DL — SIGNIFICANT CHANGE UP (ref 70–99)
HCT VFR BLD CALC: 33.8 % — LOW (ref 34.5–45)
HEV IGM SER QL: SIGNIFICANT CHANGE UP
HGB BLD-MCNC: 11.4 G/DL — LOW (ref 11.5–15.5)
INR BLD: 1.01 RATIO — SIGNIFICANT CHANGE UP (ref 0.88–1.16)
MCHC RBC-ENTMCNC: 30.8 PG — SIGNIFICANT CHANGE UP (ref 27–34)
MCHC RBC-ENTMCNC: 33.7 GM/DL — SIGNIFICANT CHANGE UP (ref 32–36)
MCV RBC AUTO: 91.4 FL — SIGNIFICANT CHANGE UP (ref 80–100)
NRBC # BLD: 0 /100 WBCS — SIGNIFICANT CHANGE UP (ref 0–0)
PLATELET # BLD AUTO: 151 K/UL — SIGNIFICANT CHANGE UP (ref 150–400)
POTASSIUM SERPL-MCNC: 4 MMOL/L — SIGNIFICANT CHANGE UP (ref 3.5–5.3)
POTASSIUM SERPL-SCNC: 4 MMOL/L — SIGNIFICANT CHANGE UP (ref 3.5–5.3)
PROT SERPL-MCNC: 6.1 G/DL — SIGNIFICANT CHANGE UP (ref 6–8.3)
PROTHROM AB SERPL-ACNC: 11.8 SEC — SIGNIFICANT CHANGE UP (ref 10.6–13.6)
RBC # BLD: 3.7 M/UL — LOW (ref 3.8–5.2)
RBC # FLD: 18.4 % — HIGH (ref 10.3–14.5)
SODIUM SERPL-SCNC: 141 MMOL/L — SIGNIFICANT CHANGE UP (ref 135–145)
WBC # BLD: 5.39 K/UL — SIGNIFICANT CHANGE UP (ref 3.8–10.5)
WBC # FLD AUTO: 5.39 K/UL — SIGNIFICANT CHANGE UP (ref 3.8–10.5)

## 2021-03-30 PROCEDURE — 80307 DRUG TEST PRSMV CHEM ANLYZR: CPT

## 2021-03-30 PROCEDURE — 81001 URINALYSIS AUTO W/SCOPE: CPT

## 2021-03-30 PROCEDURE — 86695 HERPES SIMPLEX TYPE 1 TEST: CPT

## 2021-03-30 PROCEDURE — 86720 LEPTOSPIRA ANTIBODY: CPT

## 2021-03-30 PROCEDURE — 86645 CMV ANTIBODY IGM: CPT

## 2021-03-30 PROCEDURE — 85610 PROTHROMBIN TIME: CPT

## 2021-03-30 PROCEDURE — 99285 EMERGENCY DEPT VISIT HI MDM: CPT | Mod: 25

## 2021-03-30 PROCEDURE — 86301 IMMUNOASSAY TUMOR CA 19-9: CPT

## 2021-03-30 PROCEDURE — 80074 ACUTE HEPATITIS PANEL: CPT

## 2021-03-30 PROCEDURE — 99239 HOSP IP/OBS DSCHRG MGMT >30: CPT | Mod: GC

## 2021-03-30 PROCEDURE — 82550 ASSAY OF CK (CPK): CPT

## 2021-03-30 PROCEDURE — 94640 AIRWAY INHALATION TREATMENT: CPT

## 2021-03-30 PROCEDURE — 85027 COMPLETE CBC AUTOMATED: CPT

## 2021-03-30 PROCEDURE — 86664 EPSTEIN-BARR NUCLEAR ANTIGEN: CPT

## 2021-03-30 PROCEDURE — 83690 ASSAY OF LIPASE: CPT

## 2021-03-30 PROCEDURE — 80048 BASIC METABOLIC PNL TOTAL CA: CPT

## 2021-03-30 PROCEDURE — 87522 HEPATITIS C REVRS TRNSCRPJ: CPT

## 2021-03-30 PROCEDURE — 87389 HIV-1 AG W/HIV-1&-2 AB AG IA: CPT

## 2021-03-30 PROCEDURE — 86308 HETEROPHILE ANTIBODY SCREEN: CPT

## 2021-03-30 PROCEDURE — 82728 ASSAY OF FERRITIN: CPT

## 2021-03-30 PROCEDURE — 86644 CMV ANTIBODY: CPT

## 2021-03-30 PROCEDURE — 85730 THROMBOPLASTIN TIME PARTIAL: CPT

## 2021-03-30 PROCEDURE — 74183 MRI ABD W/O CNTR FLWD CNTR: CPT

## 2021-03-30 PROCEDURE — 74177 CT ABD & PELVIS W/CONTRAST: CPT

## 2021-03-30 PROCEDURE — 84439 ASSAY OF FREE THYROXINE: CPT

## 2021-03-30 PROCEDURE — U0003: CPT

## 2021-03-30 PROCEDURE — 99233 SBSQ HOSP IP/OBS HIGH 50: CPT

## 2021-03-30 PROCEDURE — 82390 ASSAY OF CERULOPLASMIN: CPT

## 2021-03-30 PROCEDURE — 86703 HIV-1/HIV-2 1 RESULT ANTBDY: CPT

## 2021-03-30 PROCEDURE — 86803 HEPATITIS C AB TEST: CPT

## 2021-03-30 PROCEDURE — 84443 ASSAY THYROID STIM HORMONE: CPT

## 2021-03-30 PROCEDURE — 86376 MICROSOMAL ANTIBODY EACH: CPT

## 2021-03-30 PROCEDURE — 86381 MITOCHONDRIAL ANTIBODY EACH: CPT

## 2021-03-30 PROCEDURE — 82105 ALPHA-FETOPROTEIN SERUM: CPT

## 2021-03-30 PROCEDURE — 36415 COLL VENOUS BLD VENIPUNCTURE: CPT

## 2021-03-30 PROCEDURE — 87517 HEPATITIS B DNA QUANT: CPT

## 2021-03-30 PROCEDURE — 86787 VARICELLA-ZOSTER ANTIBODY: CPT

## 2021-03-30 PROCEDURE — 86665 EPSTEIN-BARR CAPSID VCA: CPT

## 2021-03-30 PROCEDURE — 82103 ALPHA-1-ANTITRYPSIN TOTAL: CPT

## 2021-03-30 PROCEDURE — 85025 COMPLETE CBC W/AUTO DIFF WBC: CPT

## 2021-03-30 PROCEDURE — 82378 CARCINOEMBRYONIC ANTIGEN: CPT

## 2021-03-30 PROCEDURE — 86696 HERPES SIMPLEX TYPE 2 TEST: CPT

## 2021-03-30 PROCEDURE — 86663 EPSTEIN-BARR ANTIBODY: CPT

## 2021-03-30 PROCEDURE — 80076 HEPATIC FUNCTION PANEL: CPT

## 2021-03-30 PROCEDURE — 86747 PARVOVIRUS ANTIBODY: CPT

## 2021-03-30 PROCEDURE — 82248 BILIRUBIN DIRECT: CPT

## 2021-03-30 PROCEDURE — 83550 IRON BINDING TEST: CPT

## 2021-03-30 PROCEDURE — 86255 FLUORESCENT ANTIBODY SCREEN: CPT

## 2021-03-30 PROCEDURE — 86038 ANTINUCLEAR ANTIBODIES: CPT

## 2021-03-30 PROCEDURE — 86790 VIRUS ANTIBODY NOS: CPT

## 2021-03-30 PROCEDURE — 83540 ASSAY OF IRON: CPT

## 2021-03-30 PROCEDURE — U0005: CPT

## 2021-03-30 PROCEDURE — 86769 SARS-COV-2 COVID-19 ANTIBODY: CPT

## 2021-03-30 PROCEDURE — 80053 COMPREHEN METABOLIC PANEL: CPT

## 2021-03-30 PROCEDURE — A9579: CPT

## 2021-03-30 RX ORDER — URSODIOL 250 MG/1
1 TABLET, FILM COATED ORAL
Qty: 28 | Refills: 0
Start: 2021-03-30 | End: 2021-04-12

## 2021-03-30 RX ORDER — CHLORPHENIRAMINE MALEATE 4 MG
1 TABLET ORAL
Qty: 0 | Refills: 0 | DISCHARGE

## 2021-03-30 RX ORDER — GLUCOSAMINE HCL/CHONDROITIN SU 500-400 MG
1 CAPSULE ORAL
Qty: 0 | Refills: 0 | DISCHARGE

## 2021-03-30 RX ORDER — ROSUVASTATIN CALCIUM 5 MG/1
1 TABLET ORAL
Qty: 0 | Refills: 0 | DISCHARGE

## 2021-03-30 RX ADMIN — Medication 25 MICROGRAM(S): at 05:12

## 2021-03-30 RX ADMIN — URSODIOL 300 MILLIGRAM(S): 250 TABLET, FILM COATED ORAL at 05:12

## 2021-03-30 RX ADMIN — HEPARIN SODIUM 5000 UNIT(S): 5000 INJECTION INTRAVENOUS; SUBCUTANEOUS at 05:11

## 2021-03-30 NOTE — PROGRESS NOTE ADULT - PROVIDER SPECIALTY LIST ADULT
Infectious Disease
Gastroenterology
Hospitalist

## 2021-03-30 NOTE — DISCHARGE NOTE NURSING/CASE MANAGEMENT/SOCIAL WORK - PATIENT PORTAL LINK FT
You can access the FollowMyHealth Patient Portal offered by [facility] by registering at the following website: [email or web address]. By joining [facility]’s FollowMyHealth portal, you will also be able to view your health information using other applications (apps) compatible with our system.

## 2021-03-30 NOTE — PROGRESS NOTE ADULT - SUBJECTIVE AND OBJECTIVE BOX
[facility]  Division of Infectious Diseases  [phone]    Name: MARELY GURROLA  Age: 75y  Gender: Female  MRN: [id number]    Interval History--  Notes reviewed    Past Medical History--  Hypothyroid    Hyperlipidemia    Allergy    Nasal polyps    No significant past surgical history    S/P tonsillectomy    S/P cataract surgery    H/O removal of cyst        For details regarding the patient's social history, family history, and other miscellaneous elements, please refer the initial infectious diseases consultation and/or the admitting history and physical examination for this admission.    Allergies    aspirin (Rash)    Intolerances        Medications--  Antibiotics:    Immunologic:    Other:  fluticasone propionate 50 MICROgram(s)/spray Nasal Spray  heparin   Injectable  lactated ringers.  levothyroxine  loratadine  melatonin PRN  montelukast  ursodiol Capsule      Review of Systems--  A 10-point review of systems was obtained.     Pertinent positives and negatives--  Constitutional: No fevers. No Chills. No Rigors.   Cardiovascular: No chest pain. No palpitations.  Respiratory: No shortness of breath. No cough.  Gastrointestinal: No nausea or vomiting. No diarrhea or constipation.   Psychiatric: Pleasant. Appropriate affect.    Review of systems otherwise negative except as previously noted.    Physical Examination--  Vital Signs: T(F): 98.3 (03-30-21 @ 04:58), Max: 98.3 (03-30-21 @ 04:58)  HR: 63 (03-30-21 @ 04:58)  BP: 109/57 (03-30-21 @ 04:58)  RR: 18 (03-30-21 @ 04:58)  SpO2: 98% (03-30-21 @ 04:58)  Wt(kg): --  General: Nontoxic-appearing Female in no acute distress.  HEENT: AT/NC. PERRL. EOMI. Anicteric. Conjunctiva pink and moist. Oropharynx clear. Dentition fair.  Neck: Not rigid. No sense of mass.  Nodes: None palpable.  Lungs: Clear bilaterally without rales, wheezing or rhonchi  Heart: Regular rate and rhythm. No Murmur. No rub. No gallop. No palpable thrill.  Abdomen: Bowel sounds present and normoactive. Soft. Nondistended. Nontender. No sense of mass. No organomegaly.  Back: No spinal tenderness. No costovertebral angle tenderness.   Extremities: No cyanosis or clubbing. No edema.   Skin: Warm. Dry. Good turgor. No rash. No vasculitic stigmata.  Psychiatric: Appropriate affect and mood for situation.         Laboratory Studies--  CBC                        11.4   5.39  )-----------( 151      ( 30 Mar 2021 06:08 )             33.8       Chemistries  03-30    141  |  108  |  11  ----------------------------<  81  4.0   |  29  |  0.68    Ca    9.1      30 Mar 2021 06:08    TPro  6.1  /  Alb  2.9<L>  /  TBili  8.0<H>  /  DBili  6.80<H>  /  AST  767<H>  /  ALT  1430<H>  /  AlkPhos  507<H>  03-30      Culture Data           [facility]  Division of Infectious Diseases  [phone]    Covering Dr. Jessica Delgado.     Name: MARELY GURROLA  Age: 75y  Gender: Female  MRN: [id number]    Interval History--  Notes reviewed. Seen earlier this am. Slated for discharge. LFT improving, serologies thus far are NTD. Patient feels ok. Denies fevers, chills, or rigors. No pruritus. No N/V/D. No pain. No other complaints.     Past Medical History--  Hypothyroid    Hyperlipidemia    Allergy    Nasal polyps    No significant past surgical history    S/P tonsillectomy    S/P cataract surgery    H/O removal of cyst        For details regarding the patient's social history, family history, and other miscellaneous elements, please refer the initial infectious diseases consultation and/or the admitting history and physical examination for this admission.    Allergies    aspirin (Rash)    Intolerances        Medications--  Antibiotics:    Immunologic:    Other:  fluticasone propionate 50 MICROgram(s)/spray Nasal Spray  heparin   Injectable  lactated ringers.  levothyroxine  loratadine  melatonin PRN  montelukast  ursodiol Capsule      Review of Systems--  A 10-point review of systems was obtained.   Review of systems otherwise negative except as previously noted.    Physical Examination--  Vital Signs: T(F): 98.3 (03-30-21 @ 04:58), Max: 98.3 (03-30-21 @ 04:58)  HR: 63 (03-30-21 @ 04:58)  BP: 109/57 (03-30-21 @ 04:58)  RR: 18 (03-30-21 @ 04:58)  SpO2: 98% (03-30-21 @ 04:58)  Wt(kg): --  General: Frairl but nontoxic-appearing Female in no acute distress.  HEENT: AT/NC. Icteric. Conjunctiva pink and moist. Oropharynx clear.  Neck: Not rigid. No sense of mass.  Nodes: None palpable.  Lungs: Clear bilaterally without rales, wheezing or rhonchi  Heart: Regular rate and rhythm. No Murmur. No rub. No gallop. No palpable thrill.  Abdomen: Bowel sounds present and normoactive. Soft. Nondistended. Nontender. No sense of mass. No organomegaly.  Back: No spinal tenderness. No costovertebral angle tenderness.   Extremities: No cyanosis or clubbing. No edema. Ulnar deviation and some joint deformity in MCP #2 and #3 bilaterally> others. Mild interosseous wasting.   Skin: Warm. Dry. Good turgor. No rash. No vasculitic stigmata. Deeply jaundiced.   Psychiatric: Appropriate affect and mood for situation.       Laboratory Studies--  CBC                        11.4   5.39  )-----------( 151      ( 30 Mar 2021 06:08 )             33.8       Chemistries  03-30    141  |  108  |  11  ----------------------------<  81  4.0   |  29  |  0.68    Ca    9.1      30 Mar 2021 06:08    LFT Trend  Total Bilirubin  8.0 mg/dL (03-30-21 @ 06:08)  10.8 mg/dL (03-29-21 @ 07:04)  13.3 mg/dL (03-28-21 @ 06:22)  13.4 mg/dL (03-27-21 @ 06:37)  14.4 mg/dL (03-26-21 @ 06:30)  16.5 mg/dL (03-25-21 @ 13:43)    Direct Bilirubin  6.80 mg/dL (03-30-21 @ 06:08)  10.80 mg/dL (03-28-21 @ 06:22)  10.70 mg/dL (03-27-21 @ 06:37)  11.40 mg/dL (03-26-21 @ 06:30)  12.90 mg/dL (03-25-21 @ 13:43)    Indirect Bilirubin  1.2 mg/dL (03-30-21 @ 06:08)  2.5 mg/dL (03-28-21 @ 06:22)  2.7 mg/dL (03-27-21 @ 06:37)  3.0 mg/dL (03-26-21 @ 06:30)  3.6 mg/dL (03-25-21 @ 13:43)    AKP  507 U/L (03-30-21 @ 06:08)  529 U/L (03-29-21 @ 07:04)  566 U/L (03-28-21 @ 06:22)  561 U/L (03-27-21 @ 06:37)  562 U/L (03-26-21 @ 06:30)  697 U/L (03-25-21 @ 13:43)    AST  767 U/L (03-30-21 @ 06:08)  987 U/L (03-29-21 @ 07:04)  1222 U/L (03-28-21 @ 06:22)  1414 U/L (03-27-21 @ 06:37)  1906 U/L (03-26-21 @ 06:30)  2040 U/L (03-25-21 @ 13:43)    ALT  1430 U/L (03-30-21 @ 06:08)  1636 U/L (03-29-21 @ 07:04)  1858 U/L (03-28-21 @ 06:22)  1966 U/L (03-27-21 @ 06:37)  2302 U/L (03-26-21 @ 06:30)  2898 U/L (03-25-21 @ 13:43)      Culture Data  No new data

## 2021-03-30 NOTE — PROGRESS NOTE ADULT - PROBLEM SELECTOR PLAN 1
ctap and mri reviewed  liver engorged, no duct blockage, no hepatobiliary mass noted  lfts improving; suspecting viral hepatitis as etiology  continue to hold home statin  cont ursodiol as tolerated  trend labs, avoid hepatotoxins  plan dw pt and attg in detail ctap, mri, serologies reviewed  liver engorged, no duct blockage, no hepatobiliary mass noted  lfts improving; likely viral hepatitis  continue to hold home statin  cont ursodiol as tolerated  trend labs, avoid hepatotoxins  as per dw attg, no gi objection to dc planning; will need close op gi f/u and lft check next wk, dw pt and agreeable ctap, mri, serologies reviewed  liver engorged, no duct blockage, no hepatobiliary mass noted  lfts improving; likely viral hepatitis vs ?dili  hold home statin/tumeric  cont ursodiol as tolerated  trend labs, avoid hepatotoxins  as per dw attg, no gi objection to dc planning; will need close op gi f/u and lft check next wk, dw pt and agreeable

## 2021-03-30 NOTE — PROGRESS NOTE ADULT - ASSESSMENT
76 YO F PMHX hypothyroidism, HLD, nasal polyps, oA b/l knees, admitted for hepatitis 2/2 drug induced vs viral vs ?neoplastic.  
76 y/o woman with PMH of hypothyroidism, HLD on crestor 5mg, nasal polyps, OA was admitted yesterday with jaundice since 3/22. She noted yellow urine and later yellow eyes and skin. Was seen in PCP office and had total bilirubin of 15.   She has no fever, chills, abdominal pain, nausea, vomiting or diarrhea.   The only new changes in her habits is starting Turmeric pills in 12/2020. She used to drink wine 1-2 glass weekly, never had IVD or heavy drinking. No contact with sick person.   Noted mouse in garage but no other animal contact. Not sexually active for more than 2 years.   She had a negative acute hepatitis panel.  EBV with evidence of old infection   CT and MRI with pericholecystic edema possibly viral hepatitis.   There are reports that Turmeric could cause hepatitis and liver damage, usually in drug induced hepatitis patients don't have any symptoms same as our case. Usually in viral hepatitis patients have fever, abdominal pain and GI symptoms with severe loss of appetite.   In Hepatitis B and C sometimes there is delay to have a positive serology so Hep B and C serology can be repeated.   Since MRI is negative, malignancy is less likely but usually malignancy such as cholangiocarcinoma or pancreatic ca could cause asymptotic jaundice.     03/30: Acute hepatitis, serologic workup to this point unrevealing. LFT's are improving. Patient received supportive care, no N-acetyl-cysteine etc. Would not expect improvement if this was neoplastic. Patient with ?hemochromatosis- would not explain improvement in LFT. Possible RA changes in hands. No other joint infammation to implicate RA flare contributing to hepatitis, however and autoimmune workup otherwise negative thus far.  HEV pending, low yield. D/W patient that she should follow up with Dr. Delgado as outpatient to see if further assessment is warranted. No ID objection to outpatient management.    I'll sign off at this time.   Thank you for the courtesy of this referral.    Kun Simms MD  Attending Physician  [facility]  Division of Infectous Diseases  [phone] 
76 YO F PMHX hypothyroidism, HLD, nasal polyps, oA b/l knees, admitted for hepatitis, 2/2 drug induced vs viral vs ?neoplastic.  
76 YO F PMHX hypothyroidism, HLD, nasal polyps, oA b/l knees, admitted for suspected viral hepatitis.   
74 yo female with PMH of hypothyroidism, HLD, nasal polyps, oA b/l knees, admitted for hepatitis 2/2 drug induced vs viral vs ?neoplastic.

## 2021-03-31 LAB — HEV AB FLD QL: NEGATIVE — SIGNIFICANT CHANGE UP

## 2021-04-29 NOTE — PROGRESS NOTE ADULT - COVID-19 NEGATIVE LAB RESULT
Per care management list patient due for pneumonia vaccine and MWV. Tried to call patient but phone disconnected. Letter mailed.   
COVID-19 ruled out

## 2021-09-14 PROBLEM — E03.9 HYPOTHYROIDISM, UNSPECIFIED: Chronic | Status: ACTIVE | Noted: 2021-03-25

## 2021-09-14 PROBLEM — E78.5 HYPERLIPIDEMIA, UNSPECIFIED: Chronic | Status: ACTIVE | Noted: 2021-03-25

## 2021-09-14 PROBLEM — T78.40XA ALLERGY, UNSPECIFIED, INITIAL ENCOUNTER: Chronic | Status: ACTIVE | Noted: 2021-03-25

## 2021-09-14 PROBLEM — J33.9 NASAL POLYP, UNSPECIFIED: Chronic | Status: ACTIVE | Noted: 2021-03-25

## 2021-09-24 ENCOUNTER — APPOINTMENT (OUTPATIENT)
Dept: OTOLARYNGOLOGY | Facility: CLINIC | Age: 76
End: 2021-09-24
Payer: MEDICARE

## 2021-09-24 VITALS
BODY MASS INDEX: 19.77 KG/M2 | HEIGHT: 66 IN | HEART RATE: 64 BPM | DIASTOLIC BLOOD PRESSURE: 69 MMHG | SYSTOLIC BLOOD PRESSURE: 124 MMHG | WEIGHT: 123 LBS

## 2021-09-24 DIAGNOSIS — Z80.9 FAMILY HISTORY OF MALIGNANT NEOPLASM, UNSPECIFIED: ICD-10-CM

## 2021-09-24 DIAGNOSIS — Z78.9 OTHER SPECIFIED HEALTH STATUS: ICD-10-CM

## 2021-09-24 DIAGNOSIS — J33.9 NASAL POLYP, UNSPECIFIED: ICD-10-CM

## 2021-09-24 DIAGNOSIS — J34.2 DEVIATED NASAL SEPTUM: ICD-10-CM

## 2021-09-24 DIAGNOSIS — Z83.3 FAMILY HISTORY OF DIABETES MELLITUS: ICD-10-CM

## 2021-09-24 DIAGNOSIS — Z87.891 PERSONAL HISTORY OF NICOTINE DEPENDENCE: ICD-10-CM

## 2021-09-24 DIAGNOSIS — J31.0 CHRONIC RHINITIS: ICD-10-CM

## 2021-09-24 DIAGNOSIS — H91.93 UNSPECIFIED HEARING LOSS, BILATERAL: ICD-10-CM

## 2021-09-24 PROCEDURE — 92557 COMPREHENSIVE HEARING TEST: CPT

## 2021-09-24 PROCEDURE — 92567 TYMPANOMETRY: CPT

## 2021-09-24 PROCEDURE — 31231 NASAL ENDOSCOPY DX: CPT

## 2021-09-24 PROCEDURE — 99203 OFFICE O/P NEW LOW 30 MIN: CPT | Mod: 25

## 2021-09-24 RX ORDER — FLUTICASONE PROPIONATE 50 UG/1
SPRAY, METERED NASAL
Refills: 0 | Status: ACTIVE | COMMUNITY

## 2021-09-24 RX ORDER — MONTELUKAST 10 MG/1
TABLET, FILM COATED ORAL
Refills: 0 | Status: ACTIVE | COMMUNITY

## 2021-09-24 RX ORDER — LEVOTHYROXINE SODIUM 0.17 MG/1
TABLET ORAL
Refills: 0 | Status: ACTIVE | COMMUNITY

## 2021-09-24 NOTE — HISTORY OF PRESENT ILLNESS
[de-identified] : Hearing loss in both ears, tv volume is up. not hearing all the sounds of her appliances.  no tinnitus, no dizziness.\par hx of nasal polyps. no surgery. not blocking breathing. singular and fluticasone and winter nettipot controls her symptoms and reduces her allergy symptoms.  allergy to cats. Most recent allergic reaction to combination of tumeric and simvastatin.  no other treatment for polyps

## 2021-09-24 NOTE — DATA REVIEWED
[de-identified] : Tymps: Type A, au\par Results obtained via insert earphones revealed WNL - moderately-severe hf SNHL [id number], au\par recs: 1) ENT f/u 2) re-eval as per MD 3) HA consult when ready

## 2021-09-24 NOTE — PROCEDURE
[Recalcitrant Symptoms] : recalcitrant symptoms  [Anatomical Abnormality] : anatomical abnormality [Anterior rhinoscopy insufficient to account for symptoms] : anterior rhinoscopy insufficient to account for symptoms [None] : none [Flexible Endoscope] : examined with the flexible endoscope [___ cm] : bilateral [unfilled]Ucm polyp(s) [S-Shaped Deviated] : S-shape deviation [Normal] : the paranasal sinuses had no abnormalities [FreeTextEntry6] : nasal polyps left medial to middle turbinate. single polyp middle meatus or right. small polyp large middle turbiate. nasopharynx looks clear. deviated septum [FreeTextEntry1] : left larger than right

## 2021-09-24 NOTE — ASSESSMENT
[FreeTextEntry1] : patient with progressive hearing loss. long standing nasal polyps without any significant symptoms controlled on flonase and singular. no need for ct scan if symptom free. no interest in surgery\par \par Plan nasoendoscopy. repeat yearly continue flonase and singulair\par \par audio

## 2021-09-24 NOTE — REASON FOR VISIT
[Initial Consultation] : an initial consultation for [FreeTextEntry2] : Nasal Polyps and hearing test

## 2021-09-24 NOTE — PHYSICAL EXAM
[Nasal Endoscopy Performed] : nasal endoscopy was performed, see procedure section for findings [] : septum deviated bilaterally [Normal] : gums are normal [Midline] : trachea located in midline position [Removed] : palatine tonsils previously removed [de-identified] : tuning forks no laterization but fades early [de-identified] : enlarged bilaterally
